# Patient Record
Sex: FEMALE | Race: WHITE | NOT HISPANIC OR LATINO | Employment: FULL TIME | URBAN - METROPOLITAN AREA
[De-identification: names, ages, dates, MRNs, and addresses within clinical notes are randomized per-mention and may not be internally consistent; named-entity substitution may affect disease eponyms.]

---

## 2017-08-21 ENCOUNTER — PATIENT OUTREACH (OUTPATIENT)
Dept: ADMINISTRATIVE | Facility: HOSPITAL | Age: 32
End: 2017-08-21

## 2017-08-21 NOTE — LETTER
August 21, 2017    Cheryl Cardozo  [address]  [place] [address]           [facility]  [address]  [facility] [address]  Phone: [phone] Dear Cheryl Cardozo    In the spirit of maintaining your good health, our system indicates that you have not been seen in the office in over 12 months and due for a visit.     Our system indicates that you are due for the following:   Health Maintenance Due   Topic Date Due    Lipid Panel  1983    TETANUS VACCINE  02/26/2001    Influenza Vaccine  08/01/2017     Omid Ornelas MD would like you to schedule an appointment for an annual exam at your earliest convenience. If you have completed any of these health maintenance requirements at an outside facility, please contact our office so we may update your health record.    If your PRIMARY CARE PHYSICIAN has changed please contact your insurance company to reflect the correct physician.    If you have any issues or need assistance in scheduling this appointment, please call the number below.     PERLA Turcios  Care Coordination Department  [facility]  [phone]

## 2017-12-04 ENCOUNTER — OFFICE VISIT (OUTPATIENT)
Dept: FAMILY MEDICINE | Facility: CLINIC | Age: 32
End: 2017-12-04
Payer: OTHER GOVERNMENT

## 2017-12-04 VITALS
BODY MASS INDEX: 43.69 KG/M2 | DIASTOLIC BLOOD PRESSURE: 68 MMHG | SYSTOLIC BLOOD PRESSURE: 113 MMHG | WEIGHT: 255.94 LBS | HEART RATE: 81 BPM | TEMPERATURE: 98 F | HEIGHT: 64 IN

## 2017-12-04 DIAGNOSIS — Z00.00 ROUTINE HISTORY AND PHYSICAL EXAMINATION OF ADULT: Primary | ICD-10-CM

## 2017-12-04 DIAGNOSIS — Z3A.13 13 WEEKS GESTATION OF PREGNANCY: ICD-10-CM

## 2017-12-04 DIAGNOSIS — E66.01 MORBID OBESITY: ICD-10-CM

## 2017-12-04 DIAGNOSIS — Z23 NEED FOR PROPHYLACTIC VACCINATION AND INOCULATION AGAINST INFLUENZA: ICD-10-CM

## 2017-12-04 PROCEDURE — 99395 PREV VISIT EST AGE 18-39: CPT | Mod: S$PBB,,, | Performed by: FAMILY MEDICINE

## 2017-12-04 PROCEDURE — 99213 OFFICE O/P EST LOW 20 MIN: CPT | Mod: PBBFAC,PO | Performed by: FAMILY MEDICINE

## 2017-12-04 PROCEDURE — 99999 PR PBB SHADOW E&M-EST. PATIENT-LVL III: CPT | Mod: PBBFAC,,, | Performed by: FAMILY MEDICINE

## 2017-12-04 PROCEDURE — 90471 IMMUNIZATION ADMIN: CPT | Mod: PBBFAC,PO

## 2017-12-04 RX ORDER — CEPHALEXIN 250 MG/1
CAPSULE ORAL
COMMUNITY
Start: 2017-12-01 | End: 2018-05-29

## 2017-12-05 NOTE — PROGRESS NOTES
Patient presents physical exam.  Also needs GYN referral for 13 week pregnancy.  Morbid obesity, states currently has lost a few pounds with current pregnancy.  Apparently gained a significant amount with previous pregnancy in the past 2 years.  she also had a miscarriage in the past year.    Past Medical History:  Past Medical History:   Diagnosis Date    ADD (attention deficit disorder)     Morbid obesity 2017    Obesity     Plantar fasciitis 2012     Past Surgical History:   Procedure Laterality Date     SECTION  2016    TONSILLECTOMY, ADENOIDECTOMY       Social History     Social History    Marital status:      Spouse name: N/A    Number of children: N/A    Years of education: N/A     Occupational History    Not on file.     Social History Main Topics    Smoking status: Former Smoker    Smokeless tobacco: Never Used    Alcohol use 1.2 oz/week     1 Glasses of wine, 1 Cans of beer per week      Comment: rarely    Drug use: Unknown    Sexual activity: Not on file     Other Topics Concern    Not on file     Social History Narrative    No narrative on file     Family History   Problem Relation Age of Onset    Cervical cancer Mother     Scleroderma Mother     Thyroid cancer Father     Diabetes Father     Bipolar disorder Father      Review of patient's allergies indicates:  No Known Allergies  Current Outpatient Prescriptions on File Prior to Visit   Medication Sig Dispense Refill    ascorbic acid (VITAMIN C) 500 MG tablet Take 500 mg by mouth once daily.        PRENATAL 25/IRON FUM/FOLIC/DHA (PRENATAL-1 ORAL) Take 1 tablet by mouth once daily at 6am.      fexofenadine (ALLEGRA ALLERGY) 180 MG tablet Take 180 mg by mouth once daily.        ibuprofen (ADVIL,MOTRIN) 200 MG tablet Take 200 mg by mouth every 6 (six) hours as needed.         No current facility-administered medications on file prior to visit.            ROS:  GENERAL: No fever, chills,  or  "significant weight changes.  HEENT: No headache or hearing complaints.  No dysphagia  Eyes: No vision complaints  CHEST: Denies KRAMER, cyanosis, wheezing, cough and sputum production.  CARDIOVASCULAR: Denies chest pain, PND, orthopnea or reduced exercise tolerance.  ABDOMEN: Appetite fine. Denies diarrhea, abdominal pain, hematemesis or blood in stool.  URINARY: No flank pain, dysuria or hematuria.  MUSCULOSKELETAL: No warmth swelling or tenderness of the joints  NEUROLOGIC: No focal weakness numbness or paresthesia  PSYCHIATRIC: Denies depression        OBJECTIVE:     Vitals:    12/04/17 1027   BP: 113/68   Pulse: 81   Temp: 98 °F (36.7 °C)   TempSrc: Oral   Weight: 116.1 kg (255 lb 15.3 oz)   Height: 5' 4" (1.626 m)     Wt Readings from Last 3 Encounters:   12/04/17 116.1 kg (255 lb 15.3 oz)   07/03/17 114.5 kg (252 lb 6.8 oz)   03/06/15 79.4 kg (175 lb)     APPEARANCE: Well nourished, well developed, in no acute distress.    HEAD: Normocephalic.  Atraumatic.  No sinus tenderness.  EYES:   Right eye: Pupil reactive.  Conjunctiva clear.    Left eye: Pupil reactive.  Conjunctiva clear.    Both fundi:  Grossly normal to nondilated exam. EOMI.    EARS: TM's intact. Light reflex normal. No retraction or perforation.    NOSE:  clear.  MOUTH & THROAT:  No pharyngeal erythema or exudate. No lesions.  NECK: Supple. No bruits.  No JVD.  No cervical lymphadenopathy.  No thyromegaly.    CHEST: Breath sounds clear bilaterally.  Normal respiratory effort  CARDIOVASCULAR: Normal rate.  Regular rhythm.  No murmurs.  No rub.  No gallops.  ABDOMEN: Bowel sounds normal.  Soft.  No tenderness.  No organomegaly.  PERIPHERAL VASCULAR: No cyanosis.  No clubbing.  No edema.  NEUROLOGIC: No focal findings.  MENTAL STATUS: Alert.  Oriented x 3.    Cheryl was seen today for annual exam.    Diagnoses and all orders for this visit:    Routine history and physical examination of adult    13 weeks gestation of pregnancy  -     Ambulatory referral " to Gynecology    Need for prophylactic vaccination and inoculation against influenza  -     Influenza - Quadrivalent (3 years & older) (PF)    Morbid obesity      Anticipatory guidance: Don't smoke.  Healthy diet and regular exercise recommended.  Recommend Tdap later in her pregnancy.  Recommend lipid screening after she completes her pregnancy

## 2017-12-13 ENCOUNTER — PATIENT MESSAGE (OUTPATIENT)
Dept: FAMILY MEDICINE | Facility: CLINIC | Age: 32
End: 2017-12-13

## 2017-12-14 ENCOUNTER — TELEPHONE (OUTPATIENT)
Dept: FAMILY MEDICINE | Facility: CLINIC | Age: 32
End: 2017-12-14

## 2017-12-14 NOTE — TELEPHONE ENCOUNTER
----- Message from Bri Ochoa sent at 12/14/2017  7:57 AM CST -----  Pt at [phone]//states she is returning your call from yesterday//please call again//oly/indu

## 2017-12-30 ENCOUNTER — NURSE TRIAGE (OUTPATIENT)
Dept: ADMINISTRATIVE | Facility: CLINIC | Age: 32
End: 2017-12-30

## 2017-12-30 NOTE — TELEPHONE ENCOUNTER
"  Reason for Disposition   Pain or burning with urination    Answer Assessment - Initial Assessment Questions  1. LOCATION: "Where does it hurt?" (e.g., left, right)      Right side   2. ONSET: "When did the pain start?"      Yesterday   3. SEVERITY: "How bad is the pain?" (e.g., Scale 1-10; mild, moderate, or severe)    - MILD (1-3): doesn't interfere with normal activities     - MODERATE (4-7): interferes with normal activities or awakens from sleep     - SEVERE (8-10): excruciating pain and patient unable to do normal activities (stays in bed)        Moderate   4. PATTERN: "Does the pain come and go, or is it constant?"       Constant   5. CAUSE: "What do you think is causing the pain?"      Recurrent UTI   6. OTHER SYMPTOMS:  "Do you have any other symptoms?" (e.g., fever, abdominal pain, vomiting, leg weakness, burning with urination, blood in urine)      Low grade fever 99.0 (O) last night   7. PREGNANCY:  "Is there any chance you are pregnant?" "When was your last menstrual period?"      17 weeks    Protocols used: ST FLANK PAIN-A-AH    "

## 2018-05-29 ENCOUNTER — OFFICE VISIT (OUTPATIENT)
Dept: ORTHOPEDICS | Facility: CLINIC | Age: 33
End: 2018-05-29
Payer: OTHER GOVERNMENT

## 2018-05-29 VITALS
HEIGHT: 64 IN | WEIGHT: 255.94 LBS | BODY MASS INDEX: 43.69 KG/M2 | HEART RATE: 79 BPM | DIASTOLIC BLOOD PRESSURE: 82 MMHG | SYSTOLIC BLOOD PRESSURE: 128 MMHG

## 2018-05-29 DIAGNOSIS — S93.431A ANKLE SYNDESMOSIS DISRUPTION, RIGHT, INITIAL ENCOUNTER: ICD-10-CM

## 2018-05-29 DIAGNOSIS — S82.831A OTHER CLOSED FRACTURE OF DISTAL END OF RIGHT FIBULA, INITIAL ENCOUNTER: Primary | ICD-10-CM

## 2018-05-29 DIAGNOSIS — E55.9 VITAMIN D DEFICIENCY: ICD-10-CM

## 2018-05-29 DIAGNOSIS — T14.8XXA FRACTURE: ICD-10-CM

## 2018-05-29 PROCEDURE — 99244 OFF/OP CNSLTJ NEW/EST MOD 40: CPT | Mod: S$PBB,57,, | Performed by: ORTHOPAEDIC SURGERY

## 2018-05-29 PROCEDURE — 99213 OFFICE O/P EST LOW 20 MIN: CPT | Mod: PBBFAC,PN | Performed by: ORTHOPAEDIC SURGERY

## 2018-05-29 PROCEDURE — 99999 PR PBB SHADOW E&M-EST. PATIENT-LVL III: CPT | Mod: PBBFAC,,, | Performed by: ORTHOPAEDIC SURGERY

## 2018-05-29 RX ORDER — SODIUM CHLORIDE 9 MG/ML
INJECTION, SOLUTION INTRAVENOUS CONTINUOUS
Status: CANCELLED | OUTPATIENT
Start: 2018-05-29

## 2018-05-29 RX ORDER — MUPIROCIN 20 MG/G
OINTMENT TOPICAL
Status: CANCELLED | OUTPATIENT
Start: 2018-05-29

## 2018-05-29 NOTE — PROGRESS NOTES
"HPI: Cheryl Cardozo is a 33 y.o. female who was referred to me by Dr. Mara Wells and was seen in consultation today for right ankle pain. She injured her ankle when she was leaving the hotel in [place] after she had a baby who is in the NICU at [facility]. She was placed in a splint and is here for f/u today. She rates her pain as 4/10 today.     PAST MEDICAL/SURGICAL/FAMILY/SOCIAL/ HISTORY: REVIEWED    ALLERGIES/MEDICATIONS: REVIEWED       Review of Systems:     Constitution: Negative.   HEENT: Negative.   Eyes: Negative.   Cardiovascular: Negative.   Respiratory: Negative.   Endocrine: Negative.   Hematologic/Lymphatic: Negative.   Skin: Negative.   Musculoskeletal: Positive for right ankle pain   Gastrointestinal: Negative.   Genitourinary: Negative.   Neurological: Negative.   Psychiatric/Behavioral: Negative.   Allergic/Immunologic: Negative.       PHYSICAL EXAM:  Vitals:    05/29/18 0935   BP: 128/82   Pulse: 79     Ht Readings from Last 1 Encounters:   05/29/18 5' 4" (1.626 m)     Wt Readings from Last 1 Encounters:   05/29/18 116.1 kg (255 lb 15.3 oz)         GENERAL: Well developed, well nourished, no acute distress.  SKIN: Skin is intact. No atrophy, abrasions or lesions are noted.   Neurological: Normal mental status. Appropriate and conversant. Alert and oriented x 3.  GAIT: in wheelchair    Right lower extremity compared with LLE:  2+ dorsalis pedis pulse.  Capillary refill < 3 seconds.  Decreased range of motion tibiotalar and subtalar joints due to pain and swelling. Normal alignment of the forefoot and the hindfoot.  Moves toes well. No fracture blisters.  Sensation to light touch intact sural, saphenous, superficial peroneal and deep peroneal nerves. + swelling and ecchymosis. No lymphadenopathy, no masses or tumors palpated.    Left anterolateral ankle has some swelling but has normal motion and is non-tender to palpation.      XRAYS:   3 views of right ankle reviewed today reveal mildly " displaced distal fibula fracture with significant widening of the medial clear space.       ASSESSMENT:        Encounter Diagnoses   Name Primary?    Fracture Yes    Other closed fracture of distal end of right fibula, initial encounter     Ankle syndesmosis disruption, right, initial encounter         PLAN:   I spent 20 minutes in consulation with the patient today. More than half the time was spent counseling the patient on her condition and the options for operative.  I recommend right ankle arthroscopy, open reduction internal fixation fibula and syndesmosis. I have explained the procedure, including indications, risks, and alternatives.  Cheryl Cardozo gave informed consent and is medically ready for surgery.

## 2018-05-30 ENCOUNTER — HOSPITAL ENCOUNTER (OUTPATIENT)
Dept: RADIOLOGY | Facility: HOSPITAL | Age: 33
Discharge: HOME | End: 2018-05-30
Attending: ORTHOPAEDIC SURGERY | Admitting: ORTHOPAEDIC SURGERY
Payer: OTHER GOVERNMENT

## 2018-05-30 ENCOUNTER — ANESTHESIA (OUTPATIENT)
Dept: SURGERY | Facility: HOSPITAL | Age: 33
End: 2018-05-30
Payer: OTHER GOVERNMENT

## 2018-05-30 ENCOUNTER — HOSPITAL ENCOUNTER (OUTPATIENT)
Facility: HOSPITAL | Age: 33
Discharge: HOME | End: 2018-05-30
Attending: ORTHOPAEDIC SURGERY | Admitting: ORTHOPAEDIC SURGERY
Payer: OTHER GOVERNMENT

## 2018-05-30 ENCOUNTER — SURGERY (OUTPATIENT)
Age: 33
End: 2018-05-30

## 2018-05-30 ENCOUNTER — ANESTHESIA EVENT (OUTPATIENT)
Dept: SURGERY | Facility: HOSPITAL | Age: 33
End: 2018-05-30
Payer: OTHER GOVERNMENT

## 2018-05-30 VITALS
BODY MASS INDEX: 39.27 KG/M2 | TEMPERATURE: 98 F | HEIGHT: 64 IN | SYSTOLIC BLOOD PRESSURE: 114 MMHG | OXYGEN SATURATION: 98 % | RESPIRATION RATE: 18 BRPM | DIASTOLIC BLOOD PRESSURE: 68 MMHG | WEIGHT: 230 LBS | HEART RATE: 78 BPM

## 2018-05-30 DIAGNOSIS — S93.431A ANKLE SYNDESMOSIS DISRUPTION, RIGHT, INITIAL ENCOUNTER: ICD-10-CM

## 2018-05-30 DIAGNOSIS — T14.8XXA FRACTURE: ICD-10-CM

## 2018-05-30 DIAGNOSIS — S82.891A CLOSED RIGHT ANKLE FRACTURE: ICD-10-CM

## 2018-05-30 DIAGNOSIS — S82.831A OTHER CLOSED FRACTURE OF DISTAL END OF RIGHT FIBULA, INITIAL ENCOUNTER: Primary | ICD-10-CM

## 2018-05-30 PROCEDURE — 01480 ANES OPEN PX LOWER L/A/F NOS: CPT | Mod: PO | Performed by: ORTHOPAEDIC SURGERY

## 2018-05-30 PROCEDURE — D9220A PRA ANESTHESIA: Mod: ANES,,, | Performed by: ANESTHESIOLOGY

## 2018-05-30 PROCEDURE — 29894 ANKLE ARTHROSCOPY/SURGERY: CPT | Mod: 51,RT,, | Performed by: ORTHOPAEDIC SURGERY

## 2018-05-30 PROCEDURE — 25000003 PHARM REV CODE 250: Mod: PO | Performed by: ANESTHESIOLOGY

## 2018-05-30 PROCEDURE — 37000008 HC ANESTHESIA 1ST 15 MINUTES: Mod: PO | Performed by: ORTHOPAEDIC SURGERY

## 2018-05-30 PROCEDURE — 63600175 PHARM REV CODE 636 W HCPCS: Mod: PO | Performed by: ANESTHESIOLOGY

## 2018-05-30 PROCEDURE — 76942 ECHO GUIDE FOR BIOPSY: CPT | Mod: 26,,, | Performed by: ANESTHESIOLOGY

## 2018-05-30 PROCEDURE — 63600175 PHARM REV CODE 636 W HCPCS: Mod: PO | Performed by: NURSE ANESTHETIST, CERTIFIED REGISTERED

## 2018-05-30 PROCEDURE — 71000033 HC RECOVERY, INTIAL HOUR: Mod: PO | Performed by: ORTHOPAEDIC SURGERY

## 2018-05-30 PROCEDURE — 63600175 PHARM REV CODE 636 W HCPCS: Mod: PO | Performed by: ORTHOPAEDIC SURGERY

## 2018-05-30 PROCEDURE — 64446 NJX AA&/STRD SC NRV NFS IMG: CPT | Mod: PO,59 | Performed by: ANESTHESIOLOGY

## 2018-05-30 PROCEDURE — 27201423 OPTIME MED/SURG SUP & DEVICES STERILE SUPPLY: Mod: PO | Performed by: ORTHOPAEDIC SURGERY

## 2018-05-30 PROCEDURE — 27829 TREAT LOWER LEG JOINT: CPT | Mod: RT,,, | Performed by: ORTHOPAEDIC SURGERY

## 2018-05-30 PROCEDURE — 27792 TREATMENT OF ANKLE FRACTURE: CPT | Mod: 51,RT,, | Performed by: ORTHOPAEDIC SURGERY

## 2018-05-30 PROCEDURE — 27200664 HC NERVE BLOCK COMPLETE KIT: Mod: PO | Performed by: ANESTHESIOLOGY

## 2018-05-30 PROCEDURE — 37000009 HC ANESTHESIA EA ADD 15 MINS: Mod: PO | Performed by: ORTHOPAEDIC SURGERY

## 2018-05-30 PROCEDURE — 36000711: Mod: PO | Performed by: ORTHOPAEDIC SURGERY

## 2018-05-30 PROCEDURE — 76000 FLUOROSCOPY <1 HR PHYS/QHP: CPT | Mod: TC,PO,59

## 2018-05-30 PROCEDURE — 25000003 PHARM REV CODE 250: Mod: PO | Performed by: NURSE ANESTHETIST, CERTIFIED REGISTERED

## 2018-05-30 PROCEDURE — 64450 NJX AA&/STRD OTHER PN/BRANCH: CPT | Mod: 59,RT,, | Performed by: ANESTHESIOLOGY

## 2018-05-30 PROCEDURE — 25000003 PHARM REV CODE 250: Mod: PO | Performed by: ORTHOPAEDIC SURGERY

## 2018-05-30 PROCEDURE — 71000039 HC RECOVERY, EACH ADD'L HOUR: Mod: PO | Performed by: ORTHOPAEDIC SURGERY

## 2018-05-30 PROCEDURE — C1713 ANCHOR/SCREW BN/BN,TIS/BN: HCPCS | Mod: PO | Performed by: ORTHOPAEDIC SURGERY

## 2018-05-30 PROCEDURE — 36000710: Mod: PO | Performed by: ORTHOPAEDIC SURGERY

## 2018-05-30 PROCEDURE — D9220A PRA ANESTHESIA: Mod: CRNA,,, | Performed by: NURSE ANESTHETIST, CERTIFIED REGISTERED

## 2018-05-30 DEVICE — SCREW BONE NON LOCK 3.5X20MM: Type: IMPLANTABLE DEVICE | Site: ANKLE | Status: FUNCTIONAL

## 2018-05-30 DEVICE — PLATE BONE FIB VARIAX LAT DIST: Type: IMPLANTABLE DEVICE | Site: ANKLE | Status: FUNCTIONAL

## 2018-05-30 DEVICE — SCREW BONE NON LOCK 3.5X12MM: Type: IMPLANTABLE DEVICE | Site: ANKLE | Status: FUNCTIONAL

## 2018-05-30 DEVICE — SCREW BONE NON LOCK 3.5X16MM: Type: IMPLANTABLE DEVICE | Site: ANKLE | Status: FUNCTIONAL

## 2018-05-30 DEVICE — SCREW BONE LOCK T10 3.5X16MM: Type: IMPLANTABLE DEVICE | Site: ANKLE | Status: FUNCTIONAL

## 2018-05-30 DEVICE — SCREW BONE LOCK T10 3.5X14MM: Type: IMPLANTABLE DEVICE | Site: ANKLE | Status: FUNCTIONAL

## 2018-05-30 RX ORDER — OXYCODONE HYDROCHLORIDE 5 MG/1
5 TABLET ORAL ONCE AS NEEDED
Status: DISCONTINUED | OUTPATIENT
Start: 2018-05-31 | End: 2018-05-30 | Stop reason: HOSPADM

## 2018-05-30 RX ORDER — MIDAZOLAM HYDROCHLORIDE 1 MG/ML
INJECTION INTRAMUSCULAR; INTRAVENOUS
Status: DISCONTINUED | OUTPATIENT
Start: 2018-05-30 | End: 2018-05-30

## 2018-05-30 RX ORDER — MEPERIDINE HYDROCHLORIDE 50 MG/ML
12.5 INJECTION INTRAMUSCULAR; INTRAVENOUS; SUBCUTANEOUS ONCE AS NEEDED
Status: DISCONTINUED | OUTPATIENT
Start: 2018-05-30 | End: 2018-05-30 | Stop reason: HOSPADM

## 2018-05-30 RX ORDER — HYDROMORPHONE HYDROCHLORIDE 2 MG/ML
0.2 INJECTION, SOLUTION INTRAMUSCULAR; INTRAVENOUS; SUBCUTANEOUS EVERY 5 MIN PRN
Status: DISCONTINUED | OUTPATIENT
Start: 2018-05-30 | End: 2018-05-30 | Stop reason: HOSPADM

## 2018-05-30 RX ORDER — OXYCODONE AND ACETAMINOPHEN 10; 325 MG/1; MG/1
1 TABLET ORAL EVERY 4 HOURS PRN
Qty: 42 TABLET | Refills: 0 | Status: SHIPPED | OUTPATIENT
Start: 2018-05-30 | End: 2018-12-04

## 2018-05-30 RX ORDER — FENTANYL CITRATE 50 UG/ML
50 INJECTION, SOLUTION INTRAMUSCULAR; INTRAVENOUS ONCE
Status: COMPLETED | OUTPATIENT
Start: 2018-05-30 | End: 2018-05-30

## 2018-05-30 RX ORDER — OXYCODONE HYDROCHLORIDE 5 MG/1
5 TABLET ORAL ONCE
Status: COMPLETED | OUTPATIENT
Start: 2018-05-30 | End: 2018-05-30

## 2018-05-30 RX ORDER — CEFAZOLIN SODIUM 1 G/3ML
2 INJECTION, POWDER, FOR SOLUTION INTRAMUSCULAR; INTRAVENOUS
Status: COMPLETED | OUTPATIENT
Start: 2018-05-30 | End: 2018-05-30

## 2018-05-30 RX ORDER — SUCCINYLCHOLINE CHLORIDE 20 MG/ML
INJECTION INTRAMUSCULAR; INTRAVENOUS
Status: DISCONTINUED | OUTPATIENT
Start: 2018-05-30 | End: 2018-05-30

## 2018-05-30 RX ORDER — SODIUM CHLORIDE 0.9 % (FLUSH) 0.9 %
3 SYRINGE (ML) INJECTION
Status: DISCONTINUED | OUTPATIENT
Start: 2018-05-30 | End: 2018-05-30 | Stop reason: HOSPADM

## 2018-05-30 RX ORDER — MUPIROCIN 20 MG/G
OINTMENT TOPICAL
Status: DISCONTINUED | OUTPATIENT
Start: 2018-05-30 | End: 2018-05-30 | Stop reason: HOSPADM

## 2018-05-30 RX ORDER — FENTANYL CITRATE 50 UG/ML
25 INJECTION, SOLUTION INTRAMUSCULAR; INTRAVENOUS EVERY 5 MIN PRN
Status: COMPLETED | OUTPATIENT
Start: 2018-05-30 | End: 2018-05-30

## 2018-05-30 RX ORDER — DEXAMETHASONE SODIUM PHOSPHATE 4 MG/ML
8 INJECTION, SOLUTION INTRA-ARTICULAR; INTRALESIONAL; INTRAMUSCULAR; INTRAVENOUS; SOFT TISSUE
Status: COMPLETED | OUTPATIENT
Start: 2018-05-30 | End: 2018-05-30

## 2018-05-30 RX ORDER — FENTANYL CITRATE 50 UG/ML
INJECTION, SOLUTION INTRAMUSCULAR; INTRAVENOUS
Status: DISCONTINUED | OUTPATIENT
Start: 2018-05-30 | End: 2018-05-30

## 2018-05-30 RX ORDER — ONDANSETRON 2 MG/ML
INJECTION INTRAMUSCULAR; INTRAVENOUS
Status: DISCONTINUED | OUTPATIENT
Start: 2018-05-30 | End: 2018-05-30

## 2018-05-30 RX ORDER — LIDOCAINE HCL/PF 100 MG/5ML
SYRINGE (ML) INTRAVENOUS
Status: DISCONTINUED | OUTPATIENT
Start: 2018-05-30 | End: 2018-05-30

## 2018-05-30 RX ORDER — SODIUM CHLORIDE 0.9 G/100ML
IRRIGANT IRRIGATION
Status: DISCONTINUED | OUTPATIENT
Start: 2018-05-30 | End: 2018-05-30 | Stop reason: HOSPADM

## 2018-05-30 RX ORDER — DIPHENHYDRAMINE HYDROCHLORIDE 50 MG/ML
25 INJECTION INTRAMUSCULAR; INTRAVENOUS EVERY 6 HOURS PRN
Status: DISCONTINUED | OUTPATIENT
Start: 2018-05-30 | End: 2018-05-30 | Stop reason: HOSPADM

## 2018-05-30 RX ORDER — PROPOFOL 10 MG/ML
VIAL (ML) INTRAVENOUS
Status: DISCONTINUED | OUTPATIENT
Start: 2018-05-30 | End: 2018-05-30

## 2018-05-30 RX ORDER — MIDAZOLAM HYDROCHLORIDE 1 MG/ML
2 INJECTION INTRAMUSCULAR; INTRAVENOUS ONCE
Status: COMPLETED | OUTPATIENT
Start: 2018-05-30 | End: 2018-05-30

## 2018-05-30 RX ORDER — KETAMINE HYDROCHLORIDE 100 MG/ML
INJECTION, SOLUTION INTRAMUSCULAR; INTRAVENOUS
Status: DISCONTINUED | OUTPATIENT
Start: 2018-05-30 | End: 2018-05-30

## 2018-05-30 RX ORDER — SODIUM CHLORIDE 9 MG/ML
INJECTION, SOLUTION INTRAVENOUS CONTINUOUS
Status: DISCONTINUED | OUTPATIENT
Start: 2018-05-30 | End: 2018-05-30 | Stop reason: HOSPADM

## 2018-05-30 RX ORDER — LIDOCAINE HYDROCHLORIDE 10 MG/ML
1 INJECTION, SOLUTION EPIDURAL; INFILTRATION; INTRACAUDAL; PERINEURAL ONCE
Status: DISCONTINUED | OUTPATIENT
Start: 2018-05-30 | End: 2018-05-30 | Stop reason: HOSPADM

## 2018-05-30 RX ORDER — SODIUM CHLORIDE, SODIUM LACTATE, POTASSIUM CHLORIDE, CALCIUM CHLORIDE 600; 310; 30; 20 MG/100ML; MG/100ML; MG/100ML; MG/100ML
INJECTION, SOLUTION INTRAVENOUS CONTINUOUS
Status: DISCONTINUED | OUTPATIENT
Start: 2018-05-30 | End: 2018-05-30 | Stop reason: HOSPADM

## 2018-05-30 RX ORDER — OXYCODONE HYDROCHLORIDE 5 MG/1
5 TABLET ORAL
Status: DISCONTINUED | OUTPATIENT
Start: 2018-05-30 | End: 2018-05-30 | Stop reason: HOSPADM

## 2018-05-30 RX ORDER — ONDANSETRON 8 MG/1
8 TABLET, ORALLY DISINTEGRATING ORAL EVERY 8 HOURS PRN
Qty: 10 TABLET | Refills: 0 | Status: SHIPPED | OUTPATIENT
Start: 2018-05-30 | End: 2019-09-13

## 2018-05-30 RX ORDER — ROCURONIUM BROMIDE 10 MG/ML
INJECTION, SOLUTION INTRAVENOUS
Status: DISCONTINUED | OUTPATIENT
Start: 2018-05-30 | End: 2018-05-30

## 2018-05-30 RX ORDER — FENTANYL CITRATE 50 UG/ML
25 INJECTION, SOLUTION INTRAMUSCULAR; INTRAVENOUS ONCE
Status: COMPLETED | OUTPATIENT
Start: 2018-05-30 | End: 2018-05-30

## 2018-05-30 RX ADMIN — PROPOFOL 50 MG: 10 INJECTION, EMULSION INTRAVENOUS at 03:05

## 2018-05-30 RX ADMIN — KETAMINE HYDROCHLORIDE 25 MG: 100 INJECTION, SOLUTION, CONCENTRATE INTRAMUSCULAR; INTRAVENOUS at 02:05

## 2018-05-30 RX ADMIN — FENTANYL CITRATE 25 MCG: 50 INJECTION, SOLUTION INTRAMUSCULAR; INTRAVENOUS at 04:05

## 2018-05-30 RX ADMIN — SUCCINYLCHOLINE CHLORIDE 140 MG: 20 INJECTION, SOLUTION INTRAMUSCULAR; INTRAVENOUS at 02:05

## 2018-05-30 RX ADMIN — SODIUM CHLORIDE, SODIUM LACTATE, POTASSIUM CHLORIDE, AND CALCIUM CHLORIDE: .6; .31; .03; .02 INJECTION, SOLUTION INTRAVENOUS at 10:05

## 2018-05-30 RX ADMIN — LIDOCAINE HYDROCHLORIDE 100 MG: 20 INJECTION PARENTERAL at 02:05

## 2018-05-30 RX ADMIN — ONDANSETRON 4 MG: 2 INJECTION, SOLUTION INTRAMUSCULAR; INTRAVENOUS at 03:05

## 2018-05-30 RX ADMIN — OXYCODONE HYDROCHLORIDE 5 MG: 5 TABLET ORAL at 04:05

## 2018-05-30 RX ADMIN — DEXAMETHASONE SODIUM PHOSPHATE 8 MG: 4 INJECTION, SOLUTION INTRAMUSCULAR; INTRAVENOUS at 10:05

## 2018-05-30 RX ADMIN — FENTANYL CITRATE 25 MCG: 50 INJECTION, SOLUTION INTRAMUSCULAR; INTRAVENOUS at 11:05

## 2018-05-30 RX ADMIN — MIDAZOLAM HYDROCHLORIDE 2 MG: 1 INJECTION, SOLUTION INTRAMUSCULAR; INTRAVENOUS at 11:05

## 2018-05-30 RX ADMIN — FENTANYL CITRATE 50 MCG: 50 INJECTION, SOLUTION INTRAMUSCULAR; INTRAVENOUS at 02:05

## 2018-05-30 RX ADMIN — CEFAZOLIN 2 G: 1 INJECTION, POWDER, FOR SOLUTION INTRAVENOUS at 02:05

## 2018-05-30 RX ADMIN — SODIUM CHLORIDE 3000 ML: 0.9 IRRIGANT IRRIGATION at 02:05

## 2018-05-30 RX ADMIN — ROCURONIUM BROMIDE 5 MG: 10 INJECTION, SOLUTION INTRAVENOUS at 02:05

## 2018-05-30 RX ADMIN — PROPOFOL 150 MG: 10 INJECTION, EMULSION INTRAVENOUS at 02:05

## 2018-05-30 RX ADMIN — MUPIROCIN 1 TUBE: 20 OINTMENT TOPICAL at 02:05

## 2018-05-30 RX ADMIN — SODIUM CHLORIDE, SODIUM LACTATE, POTASSIUM CHLORIDE, AND CALCIUM CHLORIDE: .6; .31; .03; .02 INJECTION, SOLUTION INTRAVENOUS at 02:05

## 2018-05-30 RX ADMIN — MIDAZOLAM HYDROCHLORIDE 2 MG: 1 INJECTION, SOLUTION INTRAMUSCULAR; INTRAVENOUS at 02:05

## 2018-05-30 RX ADMIN — FENTANYL CITRATE 50 MCG: 50 INJECTION, SOLUTION INTRAMUSCULAR; INTRAVENOUS at 03:05

## 2018-05-30 NOTE — DISCHARGE INSTRUCTIONS
Discharge Instructions: After Your Surgery  Youve just had surgery. During surgery, you were given medicine called anesthesia to keep you relaxed and free of pain. After surgery, you may have some pain or nausea. This is common. Here are some tips for feeling better and getting well after surgery.     Stay on schedule with your medicine.   Going home  Your healthcare provider will show you how to take care of yourself when you go home. He or she will also answer your questions. Have an adult family member or friend drive you home. For the first 24 hours after your surgery:  · Do not drive or use heavy equipment.  · Do not make important decisions or sign legal papers.  · Do not drink alcohol.  · Have someone stay with you, if needed. He or she can watch for problems and help keep you safe.  Be sure to go to all follow-up visits with your healthcare provider. And rest after your surgery for as long as your healthcare provider tells you to.  Coping with pain  If you have pain after surgery, pain medicine will help you feel better. Take it as told, before pain becomes severe. Also, ask your healthcare provider or pharmacist about other ways to control pain. This might be with heat, ice, or relaxation. And follow any other instructions your surgeon or nurse gives you.  Tips for taking pain medicine  To get the best relief possible, remember these points:  · Pain medicines can upset your stomach. Taking them with a little food may help.  · Most pain relievers taken by mouth need at least 20 to 30 minutes to start to work.  · Taking medicine on a schedule can help you remember to take it. Try to time your medicine so that you can take it before starting an activity. This might be before you get dressed, go for a walk, or sit down for dinner.  · Constipation is a common side effect of pain medicines. Call your healthcare provider before taking any medicines such as laxatives or stool softeners to help ease constipation.  Also ask if you should skip any foods. Drinking lots of fluids and eating foods such as fruits and vegetables that are high in fiber can also help. Remember, do not take laxatives unless your surgeon has prescribed them.  · Drinking alcohol and taking pain medicine can cause dizziness and slow your breathing. It can even be deadly. Do not drink alcohol while taking pain medicine.  · Pain medicine can make you react more slowly to things. Do not drive or run machinery while taking pain medicine.  Your healthcare provider may tell you to take acetaminophen to help ease your pain. Ask him or her how much you are supposed to take each day. Acetaminophen or other pain relievers may interact with your prescription medicines or other over-the-counter (OTC) medicines. Some prescription medicines have acetaminophen and other ingredients. Using both prescription and OTC acetaminophen for pain can cause you to overdose. Read the labels on your OTC medicines with care. This will help you to clearly know the list of ingredients, how much to take, and any warnings. It may also help you not take too much acetaminophen. If you have questions or do not understand the information, ask your pharmacist or healthcare provider to explain it to you before you take the OTC medicine.  Managing nausea  Some people have an upset stomach after surgery. This is often because of anesthesia, pain, or pain medicine, or the stress of surgery. These tips will help you handle nausea and eat healthy foods as you get better. If you were on a special food plan before surgery, ask your healthcare provider if you should follow it while you get better. These tips may help:  · Do not push yourself to eat. Your body will tell you when to eat and how much.  · Start off with clear liquids and soup. They are easier to digest.  · Next try semi-solid foods, such as mashed potatoes, applesauce, and gelatin, as you feel ready.  · Slowly move to solid foods. Dont  eat fatty, rich, or spicy foods at first.  · Do not force yourself to have 3 large meals a day. Instead eat smaller amounts more often.  · Take pain medicines with a small amount of solid food, such as crackers or toast, to avoid nausea.     Call your surgeon if  · You still have pain an hour after taking medicine. The medicine may not be strong enough.  · You feel too sleepy, dizzy, or groggy. The medicine may be too strong.  · You have side effects like nausea, vomiting, or skin changes, such as rash, itching, or hives.       If you have obstructive sleep apnea  You were given anesthesia medicine during surgery to keep you comfortable and free of pain. After surgery, you may have more apnea spells because of this medicine and other medicines you were given. The spells may last longer than usual.   At home:  · Keep using the continuous positive airway pressure (CPAP) device when you sleep. Unless your healthcare provider tells you not to, use it when you sleep, day or night. CPAP is a common device used to treat obstructive sleep apnea.  · Talk with your provider before taking any pain medicine, muscle relaxants, or sedatives. Your provider will tell you about the possible dangers of taking these medicines.  Date Last Reviewed: 12/1/2016 © 2000-2017 [employer]. [address] [place] [address]. All rights reserved. This information is not intended as a substitute for professional medical care. Always follow your healthcare professional's instructions.      ANKLE SURGERY  After surgery:    DOS:   Keep leg elevated until first post operative visit   Keep dressing clean and dry DO NOT CHANGE BANDAGES   Advance diet as tolerated.    Check circulation frequently in toes by pressing down on toenail. Nail should turn white and then pink WITHIN 3 SECONDS when released.   May not walk on foot to go to the bathroom  DO NOT WALK ON FOOT. Use wheelchair and crutches for walking   Resume home  medications    DONT:   Do not remove your dressing   Do not get dressing wet.   No driving until released by MD   DO NOT TAKE ADDITIONAL TYLENOL/ACETAMINOPHEN WHILE TAKING NARCOTIC PAIN MEDICATION THAT CONTAINS TYLENOL/ACETAMINOPHEN.    CALL PHYSICIAN FOR:   Burning, or numbness of the toes not relieved by elevation of the leg.   Pale or cold toes; bluish nail beds.   Redness, swelling, or bleeding.   Fever> 101   Drainage (pus) from the operative sites   Pain unrelieved by pain medication    FOR EMERGENCIES CONTACT YOUR PHYSICIAN'S OFFICE

## 2018-05-30 NOTE — PLAN OF CARE
Patient reported a csection x 9 days ago. Patient stated no possibility of being pregnant. Dr. Womack, anesthesia informed of situation and waived pregnancy test. Instructed patient of the UPT, verbalized understanding. Patient is still bleeding and breasts leaking. Offered blue pad and sanitary pad in preop for comfort.

## 2018-05-30 NOTE — ANESTHESIA POSTPROCEDURE EVALUATION
"Anesthesia Post Evaluation    Patient: Cheryl Cardozo    Procedure(s) Performed: Procedure(s) (LRB):  ARTHROSCOPY, ANKLE (Right)  ORIF, FRACTURE, FIBULA (Right)  FIXATION, SYNDESMOSIS, ANKLE (Right)    Final Anesthesia Type: general  Patient location during evaluation: PACU  Patient participation: Yes- Able to Participate  Level of consciousness: awake and alert and oriented  Post-procedure vital signs: reviewed and stable  Pain management: adequate  Airway patency: patent  PONV status at discharge: No PONV  Anesthetic complications: no      Cardiovascular status: blood pressure returned to baseline and stable  Respiratory status: unassisted and spontaneous ventilation  Hydration status: euvolemic  Follow-up not needed.        Visit Vitals  /69 (BP Location: Left arm, Patient Position: Lying)   Pulse 90   Temp 36.7 °C (98.1 °F) (Skin)   Resp 16   Ht 5' 4" (1.626 m)   Wt 104.3 kg (230 lb)   SpO2 96%   Breastfeeding? Yes   BMI 39.48 kg/m²       Pain/Ivan Score: Pain Assessment Performed: Yes (5/30/2018  3:45 PM)  Presence of Pain: denies (5/30/2018  3:45 PM)  Pain Rating Prior to Med Admin: 7 (5/30/2018  4:06 PM)  Ivan Score: 9 (5/30/2018  3:45 PM)     Pt comfortable in PACU and pain well controlled with block and PO meds.    At discharge, Pt notes some "burning" in anterior medial aspect of ankle (well within Femoral distribution) which is relieved by PO meds.  Advised her this area is uncovered by block but PO meds will help.      "

## 2018-05-30 NOTE — TRANSFER OF CARE
"Anesthesia Transfer of Care Note    Patient: Cheryl Cardozo    Procedure(s) Performed: Procedure(s) (LRB):  ARTHROSCOPY, ANKLE (Right)  ORIF, FRACTURE, FIBULA (Right)  FIXATION, SYNDESMOSIS, ANKLE (Right)    Patient location: PACU    Anesthesia Type: general    Transport from OR: Transported from OR on room air with adequate spontaneous ventilation    Post pain: adequate analgesia    Post assessment: no apparent anesthetic complications and tolerated procedure well    Post vital signs: stable    Level of consciousness: awake and sedated    Nausea/Vomiting: no nausea/vomiting    Complications: none    Transfer of care protocol was followed      Last vitals:   Visit Vitals  /69 (BP Location: Left arm, Patient Position: Lying)   Pulse 90   Temp 36.7 °C (98.1 °F) (Skin)   Resp 16   Ht 5' 4" (1.626 m)   Wt 104.3 kg (230 lb)   SpO2 96%   Breastfeeding? Yes   BMI 39.48 kg/m²     "

## 2018-05-30 NOTE — H&P
"HPI: Cheryl Cardozo is a 33 y.o. female who was referred to me by Dr. Mara Wells and was seen in consultation today for right ankle pain. She injured her ankle when she was leaving the hotel in [place] after she had a baby who is in the NICU at [facility]. She was placed in a splint and is here for f/u today. She rates her pain as 4/10 today.      PAST MEDICAL/SURGICAL/FAMILY/SOCIAL/ HISTORY: REVIEWED    ALLERGIES/MEDICATIONS: REVIEWED         Review of Systems:     Constitution: Negative.   HEENT: Negative.   Eyes: Negative.   Cardiovascular: Negative.   Respiratory: Negative.   Endocrine: Negative.   Hematologic/Lymphatic: Negative.   Skin: Negative.   Musculoskeletal: Positive for right ankle pain   Gastrointestinal: Negative.   Genitourinary: Negative.   Neurological: Negative.   Psychiatric/Behavioral: Negative.   Allergic/Immunologic: Negative.         PHYSICAL EXAM:      Vitals:     05/29/18 0935   BP: 128/82   Pulse: 79          Ht Readings from Last 1 Encounters:   05/29/18 5' 4" (1.626 m)          Wt Readings from Last 1 Encounters:   05/29/18 116.1 kg (255 lb 15.3 oz)            GENERAL: Well developed, well nourished, no acute distress.  SKIN: Skin is intact. No atrophy, abrasions or lesions are noted.   Neurological: Normal mental status. Appropriate and conversant. Alert and oriented x 3.  GAIT: in wheelchair     Right lower extremity compared with LLE:  2+ dorsalis pedis pulse.  Capillary refill < 3 seconds.  Decreased range of motion tibiotalar and subtalar joints due to pain and swelling. Normal alignment of the forefoot and the hindfoot.  Moves toes well. No fracture blisters.  Sensation to light touch intact sural, saphenous, superficial peroneal and deep peroneal nerves. + swelling and ecchymosis. No lymphadenopathy, no masses or tumors palpated.    Left anterolateral ankle has some swelling but has normal motion and is non-tender to palpation.        XRAYS:   3 views of right ankle " reviewed today reveal mildly displaced distal fibula fracture with significant widening of the medial clear space.         ASSESSMENT:           Encounter Diagnoses   Name Primary?    Fracture Yes    Other closed fracture of distal end of right fibula, initial encounter      Ankle syndesmosis disruption, right, initial encounter          PLAN:   I spent 20 minutes in consulation with the patient today. More than half the time was spent counseling the patient on her condition and the options for operative.  I recommend right ankle arthroscopy, open reduction internal fixation fibula and syndesmosis. I have explained the procedure, including indications, risks, and alternatives.  Cheryl Cardozo gave informed consent and is medically ready for surgery.

## 2018-05-30 NOTE — ANESTHESIA PREPROCEDURE EVALUATION
05/30/2018  Cheryl Cardozo is a 33 y.o., female.    Anesthesia Evaluation    I have reviewed the Patient Summary Reports.    I have reviewed the Nursing Notes.   I have reviewed the Medications.     Review of Systems  Anesthesia Hx:  No problems with previous Anesthesia    Social:  Former Smoker    Cardiovascular:  Cardiovascular Normal     Pulmonary:  Pulmonary Normal    Renal/:  Renal/ Normal     Neurological:  Neurology Normal    Endocrine:  Endocrine Normal    Psych:   Psychiatric History (ADD)          Physical Exam  General:  Well nourished, Morbid Obesity    Airway/Jaw/Neck:  Airway Findings: Mouth Opening: Normal Tongue: Normal  General Airway Assessment: Adult  Oropharynx Findings:  Mallampati: II  TM Distance: Normal, at least 6 cm  Jaw/Neck Findings:  Neck ROM: Normal ROM     Eyes/Ears/Nose:  Eyes/Ears/Nose Findings:    Dental:  Dental Findings: In tact   Chest/Lungs:  Chest/Lungs Findings: Normal Respiratory Rate     Heart/Vascular:  Heart Findings: Rate: Normal  Rhythm: Regular Rhythm        Mental Status:  Mental Status Findings:  Cooperative, Alert and Oriented         Anesthesia Plan  Type of Anesthesia, risks & benefits discussed:  Anesthesia Type:  general  Patient's Preference:   Intra-op Monitoring Plan:   Intra-op Monitoring Plan Comments:   Post Op Pain Control Plan: multimodal analgesia and peripheral nerve block  Post Op Pain Control Plan Comments:   Induction:   IV  Beta Blocker:  Patient is not currently on a Beta-Blocker (No further documentation required).       Informed Consent: Patient understands risks and agrees with Anesthesia plan.  Questions answered. Anesthesia consent signed with patient.  ASA Score: 2     Day of Surgery Review of History & Physical:  There are no significant changes.   H&P completed by Anesthesiologist.   Anesthesia Plan Notes: Pt is 9 days  "post-partum from a  under spinal anesthesia.  No HCG done because it will still show positive.  Pt reports "there is no possible way I could currently be pregnant" and has had no intercourse since delivery 9 days ago.  Pt is nursing a 9 day old in NICU at [facility].  We will review current pump & dump guidelines on discharge.    Discussed with Pt and  in Pre-op the latest data/paper on Anesthesia and nursing mothers from  from the [facility] website saying,   Pt is allowed to nurse in the PACU.  I advised her to pump and dump 6 hours after surgery then resume breastfeeding.  Ropivicaine nerve block is very low risk according to multiple sources including NIH and [employer].        Ready For Surgery From Anesthesia Perspective.       "

## 2018-05-30 NOTE — BRIEF OP NOTE
OPERATIVE NOTE    DATE OF SURGERY: 5/30/2018   TIME: 3:33 PM     PATIENT NAME: Cheryl Cardozo    PREOPERATIVE DIAGNOSIS: 1) Right distal fibula fracture  2) Right syndesmosis injury     POSTOPERATIVE DIAGNOSIS: 1) Right distal fibula fracture  2) Right syndesmosis injury     PROCEDURES: 1) Right ankle arthroscopy with removal of loose body 2) Open reduction internal fixation Right distal fibula fracture 3) Open reduction internal fixation of Right syndesmosis injury    SURGEON: Eliseo Rai MD    ASSISTANT: WAGNER Muñoz    ANESTHESIA: General endotracheal anesthesia    EBL: Less than 5 cc    COMPLICATIONS: None    SPECIMENS SENT: None.

## 2018-05-30 NOTE — DISCHARGE SUMMARY
[facility]  Discharge Note  Short Stay    Admit Date: 5/30/2018    Discharge Date and Time:  5/30/2018 3:42 PM     Attending Physician: Eliseo Rai MD     Discharge Provider: Eliseo Rai    Diagnoses:  Active Hospital Problems    Diagnosis  POA    *Closed fracture of right distal fibula [S82.831A]  Yes    Fracture [T14.8XXA]  Yes      Resolved Hospital Problems    Diagnosis Date Resolved POA   No resolved problems to display.       Discharged Condition: good    Hospital Course: Patient was admitted for an outpatient procedure and tolerated the procedure well with no complications.    Final Diagnoses: Same as principal problem.    Disposition: Home or Self Care    Follow up/Patient Instructions:    Medications:  Reconciled Home Medications:      Medication List      START taking these medications    ondansetron 8 MG Tbdl  Commonly known as:  ZOFRAN-ODT  Take 1 tablet (8 mg total) by mouth every 8 (eight) hours as needed.     oxyCODONE-acetaminophen  mg per tablet  Commonly known as:  PERCOCET  Take 1 tablet by mouth every 4 (four) hours as needed for Pain.        CONTINUE taking these medications    ALLEGRA ALLERGY 180 MG tablet  Generic drug:  fexofenadine  Take 180 mg by mouth once daily.     PRENATAL-1 ORAL  Take 1 tablet by mouth once daily at 6am.     VITAMIN C 500 MG tablet  Generic drug:  ascorbic acid (vitamin C)  Take 500 mg by mouth once daily.        STOP taking these medications    ibuprofen 200 MG tablet  Commonly known as:  ADVIL,MOTRIN            Discharge Procedure Orders  Diet general     Call MD for:  temperature >100.4     Call MD for:  persistent nausea and vomiting     Call MD for:  severe uncontrolled pain     Call MD for:  difficulty breathing, headache or visual disturbances     Call MD for:  redness, tenderness, or signs of infection (pain, swelling, redness, odor or green/yellow discharge around incision site)     Call MD for:  hives     Call MD for:   persistent dizziness or light-headedness     Call MD for:  extreme fatigue     No driving, operating heavy equipment or signing legal documents while taking pain medication     Non weight bearing     Keep surgical extremity elevated     Leave dressing on - Keep it clean, dry, and intact until clinic visit       Follow-up Information     Eliseo Rai MD In 2 weeks.    Specialty:  Orthopedic Surgery  Contact information:  [address] [facility]  [place] [address]  [phone]                   Discharge Procedure Orders (must include Diet, Follow-up, Activity):    Discharge Procedure Orders (must include Diet, Follow-up, Activity)  Diet general     Call MD for:  temperature >100.4     Call MD for:  persistent nausea and vomiting     Call MD for:  severe uncontrolled pain     Call MD for:  difficulty breathing, headache or visual disturbances     Call MD for:  redness, tenderness, or signs of infection (pain, swelling, redness, odor or green/yellow discharge around incision site)     Call MD for:  hives     Call MD for:  persistent dizziness or light-headedness     Call MD for:  extreme fatigue     No driving, operating heavy equipment or signing legal documents while taking pain medication     Non weight bearing     Keep surgical extremity elevated     Leave dressing on - Keep it clean, dry, and intact until clinic visit

## 2018-05-30 NOTE — OR NURSING
BIOM ZIP TIGHT FIXATION SYSTEM ANKLE SYNDESMOSIS TITANIUM IMPLANT REF#[id number] LOT#[id number] EXP. 2/02/2023

## 2018-05-30 NOTE — ANESTHESIA PROCEDURE NOTES
Right Popliteal Nerve Block    Patient location during procedure: pre-op   Block not for primary anesthetic.  Reason for block: at surgeon's request and post-op pain management   Post-op Pain Location: Right Ankle Pain  Start time: 5/30/2018 11:21 AM  Timeout: 5/30/2018 11:21 AM   End time: 5/30/2018 11:44 AM  Surgery related to: Right Fibula Fracture ORIF  Staffing  Anesthesiologist: KITTY MCMAHAN  Performed: anesthesiologist   Preanesthetic Checklist  Completed: patient identified, site marked, surgical consent, pre-op evaluation, timeout performed, IV checked, risks and benefits discussed and monitors and equipment checked  Peripheral Block  Patient position: left lateral decubitus  Prep: ChloraPrep and site prepped and draped  Patient monitoring: heart rate, cardiac monitor, continuous pulse ox, continuous capnometry and frequent blood pressure checks  Block type: popliteal  Laterality: right  Injection technique: continuous  Needle  Needle type: Tuohy   Needle gauge: 18 G  Needle length: 4 in  Needle localization: anatomical landmarks, nerve stimulator and ultrasound guidance  Needle insertion depth: 7 cm  Catheter type: spring wound  Catheter size: 20 G  Catheter at skin depth: 7 cm  Test dose: lidocaine 1.5% with Epi 1-to-200,000 and negative   -ultrasound image captured on disc.  Assessment  Injection assessment: negative aspiration, negative parasthesia and local visualized surrounding nerve  Paresthesia pain: none  Heart rate change: no  Slow fractionated injection: yes  Medications:  Bolus administered: 30 mL of 0.5 ropivacaine  Epinephrine added: none  Additional Notes  Decadron 4mg.    Great visualization of the nerve and LOT at 0.3mA.  Dosed with good visualization of nerve with medication spread above and below.  Catheter secured at 7cm.

## 2018-05-30 NOTE — OP NOTE
DATE OF SURGERY: 5/30/2018   TIME: 3:33 PM     PATIENT NAME: Cheryl Cardozo    PREOPERATIVE DIAGNOSIS: 1) Right distal fibula fracture  2) Right syndesmosis injury       POSTOPERATIVE DIAGNOSIS: 1) Right distal fibula fracture  2) Right syndesmosis injury     PROCEDURES: 1) Right ankle arthroscopy with removal of loose body 2) Open reduction internal fixation Right distal fibula fracture 3) Open reduction internal fixation of Right syndesmosis injury    SURGEON: Eliseo Rai MD    ASSISTANT: WAGNER Muñoz    ANESTHESIA: General endotracheal anesthesia    EBL: Less than 5 cc    COMPLICATIONS: None    SPECIMENS SENT: None.    PROCEDURE IN DETAIL:  After appropriate informed consent was obtained, patient was taken to the operating room and placed in the supine position on the operating table. The Right lower extremity was prepped and draped in the usual sterile fashion.  Tourniquet was raised to 350 mmHg after esmarch exsanguination of the limb.      The foot was placed in the ankle distractor and the ankle was distracted. The anteromedial portal for ankle arthroscopy was established using an 18 gauge spinal needle, injecting and aspirating back 10 cc of normal saline. A very small incision was made using a #11 blade just medial to the tibialis anterior tendon in line with the spinal needle insertion site through the skin only. A small hemostat was introduced and using to spread down to the capsule. The blunt trocar was placed into the joint. The camera was inserted and the ankle joint was inspected medially and laterally. There was good visualization of the joint, however, there was abundant fracture hematoma.     The anterolateral portal was established using 18 gauge spinal needle which was inserted into the lateral joint space while visualized using the camera. A very small incision as made using a #11 blade in line with the spinal needle insertion site through the skin only. The Linvatec 2.9 mm  shaver was placed and used to perofrm debridement of the synovitis. There was a small defect in the cartilage of the most posterior aspect of the tibia. One cartilaginous loose body was found and removed using grasper and shaver. The camera was switched to the lateral portal where the joint was again inspected. Last look was made through the medial and lateral gutters and the posterior ankle and then the trocar and the cameras were removed. The patient's leg was taken out of the well leg rodriguez.     Next an approximately, 7 cm incision was made overlying the midline of the distal fibula using a # 15 blade through the skin and subcutaneous tissue down to the level of the bone. Proximally, the incision was carried through the skin only in order to protect the superficial branch of the peroneal nerve.  The nerve was identified and retracted throughout the case. The fracture site was identified and cleared of osseous and soft tissue debris using browne rongeur and key elevator.  The fracture fragments were reduced using pointed reduction clamps.  AP/LAT/Mortise views of the left ankle under fluoroscopy showed  good reduction of the lateral malleolus.  A 20 mm x 3.5mm  lag screw was placed from anterior to posterior, proximal to distal in order to maintain reduction.  A Roberth distal fibular locking plate was placed on the lateral aspect of the fibula.  The first two screws inserted were non-locking screws, one proximal and one distal to the fracture site in order to bring the plate down to the bone.  The remainder of the screws placed distally were locking screws and proximally were non-locking screws.      The syndesmosis was stressed intraoperatively under flouroscopy using a pointed reduction clamp and noted to be unstable. The syndesmosis was reduced using a Mccoy clamp and one Kimberly ZipTite was placed through the plate in order to fixate the syndesmosis reduction.  AP/LAT/Mortise views of the right ankle under  fluoroscopy showed good reduction, with acceptable alignment and placement of screws.   The incisions were irrigated with normal saline. The deep layer of the lateral incision was re-approximated using 0 Monocryl in interrupted fashion, subcutaneous layer was re-approximated using 2.0 Monocryl in interrupted fashion, skin was re-approximated in  using 3.0 nylon in a running fashion. The portal sites were closed using 3.0 nylon sutures. Sterile dressing using Xeroform, bacitracin, 4 x 8's, ABD, cast padding, posterior splint and stirrups with the foot in neutral dorsiflexion was placed. Patient tolerated the procedure well without complications. I was present and scrubbed for the entire case.

## 2018-05-31 NOTE — ANESTHESIA POST-OP PAIN MANAGEMENT
Acute Pain Service Progress Note    hCeryl Cardozo is a 33 y.o., female, [id number].    Surgery:  Right Fibula ORIF    Post Op Day #: 1    Catheter type: perineural  popliteal    Infusion type: Ropivacaine 0.2%  10 basal    Problem List:    Active Hospital Problems    Diagnosis  POA    *Closed fracture of right distal fibula [S82.831A]  Yes    Fracture [T14.8XXA]  Yes      Resolved Hospital Problems    Diagnosis Date Resolved POA   No resolved problems to display.       Subjective:    Pt very comfortable with no pain at fibula.  Some anterior ankle pain relieved with PO meds.    Assessment:     Pain control adequate, NAASANJEEV, Lucy.    Plan:     Patient doing well, continue present treatment.    Evaluator De Womack

## 2018-06-01 NOTE — ADDENDUM NOTE
Addendum  created 06/01/18 1011 by Lukas Crowell MD    Anesthesia Event edited, Sign clinical note

## 2018-06-11 DIAGNOSIS — S82.831A OTHER CLOSED FRACTURE OF DISTAL END OF RIGHT FIBULA, INITIAL ENCOUNTER: Primary | ICD-10-CM

## 2018-06-12 ENCOUNTER — OFFICE VISIT (OUTPATIENT)
Dept: ORTHOPEDICS | Facility: CLINIC | Age: 33
End: 2018-06-12
Payer: OTHER GOVERNMENT

## 2018-06-12 ENCOUNTER — HOSPITAL ENCOUNTER (OUTPATIENT)
Dept: RADIOLOGY | Facility: HOSPITAL | Age: 33
Discharge: HOME | End: 2018-06-12
Attending: ORTHOPAEDIC SURGERY
Payer: OTHER GOVERNMENT

## 2018-06-12 VITALS — WEIGHT: 230 LBS | BODY MASS INDEX: 39.27 KG/M2 | HEIGHT: 64 IN

## 2018-06-12 DIAGNOSIS — S93.431A ANKLE SYNDESMOSIS DISRUPTION, RIGHT, INITIAL ENCOUNTER: ICD-10-CM

## 2018-06-12 DIAGNOSIS — S82.831A OTHER CLOSED FRACTURE OF DISTAL END OF RIGHT FIBULA, INITIAL ENCOUNTER: ICD-10-CM

## 2018-06-12 DIAGNOSIS — S82.831D OTHER CLOSED FRACTURE OF DISTAL END OF RIGHT FIBULA WITH ROUTINE HEALING, SUBSEQUENT ENCOUNTER: Primary | ICD-10-CM

## 2018-06-12 PROBLEM — T14.8XXA FRACTURE: Status: RESOLVED | Noted: 2018-05-29 | Resolved: 2018-06-12

## 2018-06-12 PROCEDURE — 29405 APPL SHORT LEG CAST: CPT | Mod: S$PBB,58,RT, | Performed by: ORTHOPAEDIC SURGERY

## 2018-06-12 PROCEDURE — 73610 X-RAY EXAM OF ANKLE: CPT | Mod: TC,PO,RT

## 2018-06-12 PROCEDURE — 29405 APPL SHORT LEG CAST: CPT | Mod: PBBFAC,PN | Performed by: ORTHOPAEDIC SURGERY

## 2018-06-12 PROCEDURE — 99212 OFFICE O/P EST SF 10 MIN: CPT | Mod: PBBFAC,25,PN | Performed by: ORTHOPAEDIC SURGERY

## 2018-06-12 PROCEDURE — 73610 X-RAY EXAM OF ANKLE: CPT | Mod: 26,RT,, | Performed by: RADIOLOGY

## 2018-06-12 PROCEDURE — 99024 POSTOP FOLLOW-UP VISIT: CPT | Mod: ,,, | Performed by: ORTHOPAEDIC SURGERY

## 2018-06-12 PROCEDURE — 99999 PR PBB SHADOW E&M-EST. PATIENT-LVL II: CPT | Mod: PBBFAC,,, | Performed by: ORTHOPAEDIC SURGERY

## 2018-06-12 NOTE — PROGRESS NOTES
Subjective:      Patient ID: Cheryl Cardozo is a 33 y.o. female.    Chief Complaint: Post-op Evaluation of the Right Ankle and Post-op Evaluation (s/p ATS with removal loose body; ORIF distal fibula & syndesmosis 5/30/18)    Doing well today. She rates her pain as 0/10 today.  Social History     Occupational History    Not on file.     Social History Main Topics    Smoking status: Former Smoker    Smokeless tobacco: Never Used      Comment: 5 years quit    Alcohol use Yes      Comment: rarely    Drug use: No    Sexual activity: Not on file            Objective:    Ortho Exam   RLE: neurovascularly intact, incisions healing well. +swelling. No signs of infection.       Assessment:       1. Other closed fracture of distal end of right fibula with routine healing, subsequent encounter    2. Ankle syndesmosis disruption, right, initial encounter          Plan:       Short leg cast applied. Non-weightbearing. F/u 2 weeks with xray of the right ankle out of plaster.

## 2018-06-25 DIAGNOSIS — S82.831D OTHER CLOSED FRACTURE OF DISTAL END OF RIGHT FIBULA WITH ROUTINE HEALING, SUBSEQUENT ENCOUNTER: Primary | ICD-10-CM

## 2018-06-26 ENCOUNTER — HOSPITAL ENCOUNTER (OUTPATIENT)
Dept: RADIOLOGY | Facility: HOSPITAL | Age: 33
Discharge: HOME | End: 2018-06-26
Attending: ORTHOPAEDIC SURGERY
Payer: OTHER GOVERNMENT

## 2018-06-26 ENCOUNTER — OFFICE VISIT (OUTPATIENT)
Dept: ORTHOPEDICS | Facility: CLINIC | Age: 33
End: 2018-06-26
Payer: OTHER GOVERNMENT

## 2018-06-26 VITALS
HEART RATE: 76 BPM | WEIGHT: 230 LBS | SYSTOLIC BLOOD PRESSURE: 103 MMHG | DIASTOLIC BLOOD PRESSURE: 69 MMHG | HEIGHT: 64 IN | BODY MASS INDEX: 39.27 KG/M2

## 2018-06-26 DIAGNOSIS — S93.431A ANKLE SYNDESMOSIS DISRUPTION, RIGHT, INITIAL ENCOUNTER: Primary | ICD-10-CM

## 2018-06-26 DIAGNOSIS — S82.831D OTHER CLOSED FRACTURE OF DISTAL END OF RIGHT FIBULA WITH ROUTINE HEALING, SUBSEQUENT ENCOUNTER: ICD-10-CM

## 2018-06-26 PROCEDURE — 99024 POSTOP FOLLOW-UP VISIT: CPT | Mod: S$PBB,,, | Performed by: ORTHOPAEDIC SURGERY

## 2018-06-26 PROCEDURE — 73610 X-RAY EXAM OF ANKLE: CPT | Mod: 26,RT,, | Performed by: RADIOLOGY

## 2018-06-26 PROCEDURE — 99999 PR PBB SHADOW E&M-EST. PATIENT-LVL III: CPT | Mod: PBBFAC,,, | Performed by: ORTHOPAEDIC SURGERY

## 2018-06-26 PROCEDURE — 73610 X-RAY EXAM OF ANKLE: CPT | Mod: TC,PO,RT

## 2018-06-26 PROCEDURE — 29405 APPL SHORT LEG CAST: CPT | Mod: S$PBB,58,RT, | Performed by: ORTHOPAEDIC SURGERY

## 2018-06-26 PROCEDURE — 99213 OFFICE O/P EST LOW 20 MIN: CPT | Mod: PBBFAC,25,PN | Performed by: ORTHOPAEDIC SURGERY

## 2018-06-26 PROCEDURE — 29405 APPL SHORT LEG CAST: CPT | Mod: PBBFAC,PN | Performed by: ORTHOPAEDIC SURGERY

## 2018-06-26 NOTE — PROGRESS NOTES
Subjective:      Patient ID: Cheryl Cardozo is a 33 y.o. female.    Chief Complaint: Post-op Evaluation of the Right Ankle and Post-op Evaluation (s/p ATS with removal of loose body; ORIF distal fibula & syndesmosis 5/30/18)    Doing well today. She rates her pain as 0/10 today.  Social History     Occupational History    Not on file.     Social History Main Topics    Smoking status: Former Smoker    Smokeless tobacco: Never Used      Comment: 5 years quit    Alcohol use Yes      Comment: rarely    Drug use: No    Sexual activity: Not on file            Objective:    Ortho Exam   RLE: neurovascularly intact, incisions healing well. Mild swelling. No signs of infection. Minimal  tenderness to palpation       XRAYS: 3 views of right ankle obtained and reviewed today reveal  Interval progression of healing. Good alignment. Hardware is intact  .       Assessment:       1. Ankle syndesmosis disruption, right, initial encounter    2. Other closed fracture of distal end of right fibula with routine healing, subsequent encounter          Plan:       Short leg cast applied. Non-weightbearing. F/u 2 weeks with xray of the right ankle out of plaster.

## 2018-07-09 DIAGNOSIS — S93.431A ANKLE SYNDESMOSIS DISRUPTION, RIGHT, INITIAL ENCOUNTER: Primary | ICD-10-CM

## 2018-07-12 ENCOUNTER — HOSPITAL ENCOUNTER (OUTPATIENT)
Dept: RADIOLOGY | Facility: HOSPITAL | Age: 33
Discharge: HOME | End: 2018-07-12
Attending: ORTHOPAEDIC SURGERY
Payer: OTHER GOVERNMENT

## 2018-07-12 ENCOUNTER — OFFICE VISIT (OUTPATIENT)
Dept: ORTHOPEDICS | Facility: CLINIC | Age: 33
End: 2018-07-12
Payer: OTHER GOVERNMENT

## 2018-07-12 VITALS
DIASTOLIC BLOOD PRESSURE: 74 MMHG | BODY MASS INDEX: 39.27 KG/M2 | HEART RATE: 69 BPM | SYSTOLIC BLOOD PRESSURE: 109 MMHG | WEIGHT: 230 LBS | HEIGHT: 64 IN

## 2018-07-12 DIAGNOSIS — S93.431A ANKLE SYNDESMOSIS DISRUPTION, RIGHT, INITIAL ENCOUNTER: ICD-10-CM

## 2018-07-12 DIAGNOSIS — S82.831D OTHER CLOSED FRACTURE OF DISTAL END OF RIGHT FIBULA WITH ROUTINE HEALING, SUBSEQUENT ENCOUNTER: Primary | ICD-10-CM

## 2018-07-12 PROCEDURE — 73610 X-RAY EXAM OF ANKLE: CPT | Mod: TC,PO,RT

## 2018-07-12 PROCEDURE — 97760 ORTHOTIC MGMT&TRAING 1ST ENC: CPT | Mod: PBBFAC,PN | Performed by: ORTHOPAEDIC SURGERY

## 2018-07-12 PROCEDURE — 99999 PR PBB SHADOW E&M-EST. PATIENT-LVL III: CPT | Mod: PBBFAC,,, | Performed by: ORTHOPAEDIC SURGERY

## 2018-07-12 PROCEDURE — 99024 POSTOP FOLLOW-UP VISIT: CPT | Mod: ,,, | Performed by: ORTHOPAEDIC SURGERY

## 2018-07-12 PROCEDURE — 73610 X-RAY EXAM OF ANKLE: CPT | Mod: 26,RT,, | Performed by: RADIOLOGY

## 2018-07-12 PROCEDURE — 99213 OFFICE O/P EST LOW 20 MIN: CPT | Mod: PBBFAC,25,PN | Performed by: ORTHOPAEDIC SURGERY

## 2018-07-12 NOTE — PROGRESS NOTES
Subjective:      Patient ID: Cheryl Cardozo is a 33 y.o. female.    Chief Complaint: Post-op Evaluation of the Right Ankle and Post-op Evaluation (s/p ATS with debridement; ORIF distal fibula & syndesmosis 5/30/18)    Doing well today. She rates her pain as 0/10 today.  Social History     Occupational History    Not on file.     Social History Main Topics    Smoking status: Former Smoker    Smokeless tobacco: Never Used      Comment: 5 years quit    Alcohol use Yes      Comment: rarely    Drug use: No    Sexual activity: Not on file            Objective:    Ortho Exam   RLE: neurovascularly intact, incisions healing well. Minimal swelling. No signs of infection.  No tenderness to palpation.      XRAYS: 3 views of right ankle obtained and reviewed today reveal  Interval progression of healing. Good alignment. Hardware is intact .      Assessment:       1. Other closed fracture of distal end of right fibula with routine healing, subsequent encounter    2. Ankle syndesmosis disruption, right, initial encounter          Plan:       We performed a custom orthotic/brace adjustment, fitting and training with the patient today. The patient demonstrated understanding and proper care. This was performed for 15 minutes.  Short boot was given.   F/u 2 weeks with xray of the right ankle. PT 2/6.

## 2018-07-16 ENCOUNTER — PATIENT MESSAGE (OUTPATIENT)
Dept: FAMILY MEDICINE | Facility: CLINIC | Age: 33
End: 2018-07-16

## 2018-07-17 ENCOUNTER — TELEPHONE (OUTPATIENT)
Dept: FAMILY MEDICINE | Facility: CLINIC | Age: 33
End: 2018-07-17

## 2018-07-17 ENCOUNTER — PATIENT MESSAGE (OUTPATIENT)
Dept: ORTHOPEDICS | Facility: CLINIC | Age: 33
End: 2018-07-17

## 2018-07-17 ENCOUNTER — TELEPHONE (OUTPATIENT)
Dept: ORTHOPEDICS | Facility: CLINIC | Age: 33
End: 2018-07-17

## 2018-07-17 DIAGNOSIS — S93.431A ANKLE SYNDESMOSIS DISRUPTION, RIGHT, INITIAL ENCOUNTER: ICD-10-CM

## 2018-07-17 DIAGNOSIS — S82.301D CLOSED FRACTURE OF DISTAL END OF FIBULA WITH TIBIA, RIGHT, WITH ROUTINE HEALING, SUBSEQUENT ENCOUNTER: Primary | ICD-10-CM

## 2018-07-17 DIAGNOSIS — S82.831D CLOSED FRACTURE OF DISTAL END OF FIBULA WITH TIBIA, RIGHT, WITH ROUTINE HEALING, SUBSEQUENT ENCOUNTER: Primary | ICD-10-CM

## 2018-07-17 DIAGNOSIS — S82.831D OTHER CLOSED FRACTURE OF DISTAL END OF RIGHT FIBULA WITH ROUTINE HEALING, SUBSEQUENT ENCOUNTER: Primary | ICD-10-CM

## 2018-07-17 NOTE — TELEPHONE ENCOUNTER
----- Message from Rajani Peoples sent at 7/17/2018  2:24 PM CDT -----  Contact: nathalie/[facility] [phone]  States that she is calling to check status on referral from [facility]. Please call back at [phone]//thank you acc

## 2018-07-20 ENCOUNTER — TELEPHONE (OUTPATIENT)
Dept: ORTHOPEDICS | Facility: CLINIC | Age: 33
End: 2018-07-20

## 2018-07-20 NOTE — TELEPHONE ENCOUNTER
----- Message from Verena Mendez sent at 7/20/2018  1:40 PM CDT -----  Contact: Jennifer with [facility] with [facility] calling to speak with office regarding this patients referral for physical therapy. She is calling to advise that they would need a prior authorization sent to [facility] and the orders to be faxed to their office. They will need that authorizaiton and the order faxed over in order to schedule the appt. Please call back and advise.     Call back# [phone]  Fax# [phone]  Thanks

## 2018-07-25 DIAGNOSIS — S82.831D OTHER CLOSED FRACTURE OF DISTAL END OF RIGHT FIBULA WITH ROUTINE HEALING, SUBSEQUENT ENCOUNTER: Primary | ICD-10-CM

## 2018-07-26 ENCOUNTER — HOSPITAL ENCOUNTER (OUTPATIENT)
Dept: RADIOLOGY | Facility: HOSPITAL | Age: 33
Discharge: HOME | End: 2018-07-26
Attending: ORTHOPAEDIC SURGERY
Payer: OTHER GOVERNMENT

## 2018-07-26 ENCOUNTER — OFFICE VISIT (OUTPATIENT)
Dept: ORTHOPEDICS | Facility: CLINIC | Age: 33
End: 2018-07-26
Payer: OTHER GOVERNMENT

## 2018-07-26 VITALS
DIASTOLIC BLOOD PRESSURE: 61 MMHG | BODY MASS INDEX: 39.26 KG/M2 | WEIGHT: 229.94 LBS | HEIGHT: 64 IN | HEART RATE: 71 BPM | SYSTOLIC BLOOD PRESSURE: 129 MMHG

## 2018-07-26 DIAGNOSIS — S93.431A ANKLE SYNDESMOSIS DISRUPTION, RIGHT, INITIAL ENCOUNTER: ICD-10-CM

## 2018-07-26 DIAGNOSIS — S82.831D OTHER CLOSED FRACTURE OF DISTAL END OF RIGHT FIBULA WITH ROUTINE HEALING, SUBSEQUENT ENCOUNTER: ICD-10-CM

## 2018-07-26 DIAGNOSIS — S82.831D OTHER CLOSED FRACTURE OF DISTAL END OF RIGHT FIBULA WITH ROUTINE HEALING, SUBSEQUENT ENCOUNTER: Primary | ICD-10-CM

## 2018-07-26 PROCEDURE — 99213 OFFICE O/P EST LOW 20 MIN: CPT | Mod: PBBFAC,25,PN | Performed by: ORTHOPAEDIC SURGERY

## 2018-07-26 PROCEDURE — 99999 PR PBB SHADOW E&M-EST. PATIENT-LVL III: CPT | Mod: PBBFAC,,, | Performed by: ORTHOPAEDIC SURGERY

## 2018-07-26 PROCEDURE — 99024 POSTOP FOLLOW-UP VISIT: CPT | Mod: ,,, | Performed by: ORTHOPAEDIC SURGERY

## 2018-07-26 PROCEDURE — 73610 X-RAY EXAM OF ANKLE: CPT | Mod: 26,RT,, | Performed by: RADIOLOGY

## 2018-07-26 PROCEDURE — 73610 X-RAY EXAM OF ANKLE: CPT | Mod: TC,PO,RT

## 2018-07-27 NOTE — PROGRESS NOTES
Subjective:      Patient ID: Cheryl Cardozo is a 33 y.o. female.    Chief Complaint: Post-op Evaluation of the Right Ankle    Doing well today. She rates her pain as 2/10 today. She is doing well with PT. Pt is walking in a tennis shoe.   Social History     Occupational History    Not on file.     Social History Main Topics    Smoking status: Former Smoker    Smokeless tobacco: Never Used      Comment: 5 years quit    Alcohol use Yes      Comment: rarely    Drug use: No    Sexual activity: Not on file            Objective:    Ortho Exam   RLE: neurovascularly intact, incisions healing well. Minimal swelling. No signs of infection.  No tenderness to palpation.      XRAYS: 3 views of right ankle obtained and reviewed today reveal  Interval progression of healing. Good alignment. Hardware is intact .      Assessment:         s/p ORIF right trimalleolar ankle fracture DOS: 5/30/18  Plan:       Continue PT.  F/u  weeks with xray of the right ankle.

## 2018-07-31 ENCOUNTER — PATIENT MESSAGE (OUTPATIENT)
Dept: ORTHOPEDICS | Facility: CLINIC | Age: 33
End: 2018-07-31

## 2018-08-21 DIAGNOSIS — M25.571 RIGHT ANKLE PAIN, UNSPECIFIED CHRONICITY: Primary | ICD-10-CM

## 2018-08-23 ENCOUNTER — HOSPITAL ENCOUNTER (OUTPATIENT)
Dept: RADIOLOGY | Facility: HOSPITAL | Age: 33
Discharge: HOME | End: 2018-08-23
Attending: ORTHOPAEDIC SURGERY
Payer: OTHER GOVERNMENT

## 2018-08-23 ENCOUNTER — OFFICE VISIT (OUTPATIENT)
Dept: ORTHOPEDICS | Facility: CLINIC | Age: 33
End: 2018-08-23
Payer: OTHER GOVERNMENT

## 2018-08-23 VITALS
SYSTOLIC BLOOD PRESSURE: 114 MMHG | HEIGHT: 64 IN | BODY MASS INDEX: 39.26 KG/M2 | DIASTOLIC BLOOD PRESSURE: 77 MMHG | WEIGHT: 229.94 LBS | HEART RATE: 77 BPM

## 2018-08-23 DIAGNOSIS — S82.831D OTHER CLOSED FRACTURE OF DISTAL END OF RIGHT FIBULA WITH ROUTINE HEALING, SUBSEQUENT ENCOUNTER: Primary | ICD-10-CM

## 2018-08-23 DIAGNOSIS — M25.571 RIGHT ANKLE PAIN, UNSPECIFIED CHRONICITY: ICD-10-CM

## 2018-08-23 DIAGNOSIS — S93.431A ANKLE SYNDESMOSIS DISRUPTION, RIGHT, INITIAL ENCOUNTER: ICD-10-CM

## 2018-08-23 PROCEDURE — 99024 POSTOP FOLLOW-UP VISIT: CPT | Mod: ,,, | Performed by: ORTHOPAEDIC SURGERY

## 2018-08-23 PROCEDURE — 99999 PR PBB SHADOW E&M-EST. PATIENT-LVL III: CPT | Mod: PBBFAC,,, | Performed by: ORTHOPAEDIC SURGERY

## 2018-08-23 PROCEDURE — 73610 X-RAY EXAM OF ANKLE: CPT | Mod: 26,RT,, | Performed by: RADIOLOGY

## 2018-08-23 PROCEDURE — 73610 X-RAY EXAM OF ANKLE: CPT | Mod: TC,PO,RT

## 2018-08-23 PROCEDURE — 99213 OFFICE O/P EST LOW 20 MIN: CPT | Mod: PBBFAC,25,PN | Performed by: ORTHOPAEDIC SURGERY

## 2018-08-23 NOTE — PROGRESS NOTES
Subjective:      Patient ID: Cheryl Cardozo is a 33 y.o. female.    Chief Complaint: Pain, Swelling, and Weakness of the Right Ankle    Doing well today. She rates her pain as 2/10 today. She is doing well with PT. Pt is walking in a tennis shoe. She says it still feels weak and swells.   Social History     Occupational History    Not on file   Tobacco Use    Smoking status: Former Smoker    Smokeless tobacco: Never Used    Tobacco comment: 5 years quit   Substance and Sexual Activity    Alcohol use: Yes     Comment: rarely    Drug use: No    Sexual activity: Not on file            Objective:    Ortho Exam   RLE: neurovascularly intact, incisions healing well. Minimal swelling. No signs of infection.  No tenderness to palpation. Negative anterior drawer test.       XRAYS: 3 views of right ankle obtained and reviewed today reveal fractures are well healed. Good alignment. Hardware is intact .      Assessment:         s/p ORIF right trimalleolar ankle fracture DOS: 5/30/18  Plan:       Continue PT.  I told her that the swelling and weakness will improve with time.  F/u prn.

## 2018-11-29 ENCOUNTER — TELEPHONE (OUTPATIENT)
Dept: FAMILY MEDICINE | Facility: CLINIC | Age: 33
End: 2018-11-29

## 2018-11-29 NOTE — TELEPHONE ENCOUNTER
----- Message from Rolanda Gaitan sent at 11/29/2018 10:39 AM CST -----  Contact: pt  Pt stated she will like to be seen today, she has a fever and rash on neck and shoulder on left side, she can be reached at [phone] Thanks

## 2018-12-04 ENCOUNTER — OFFICE VISIT (OUTPATIENT)
Dept: FAMILY MEDICINE | Facility: CLINIC | Age: 33
End: 2018-12-04
Payer: OTHER GOVERNMENT

## 2018-12-04 VITALS
SYSTOLIC BLOOD PRESSURE: 127 MMHG | WEIGHT: 266 LBS | DIASTOLIC BLOOD PRESSURE: 82 MMHG | TEMPERATURE: 98 F | HEART RATE: 78 BPM | HEIGHT: 64 IN | BODY MASS INDEX: 45.41 KG/M2

## 2018-12-04 DIAGNOSIS — R00.2 PALPITATIONS: ICD-10-CM

## 2018-12-04 DIAGNOSIS — R21 RASH: ICD-10-CM

## 2018-12-04 DIAGNOSIS — T78.40XD ALLERGIC REACTION, SUBSEQUENT ENCOUNTER: Primary | ICD-10-CM

## 2018-12-04 PROCEDURE — 99213 OFFICE O/P EST LOW 20 MIN: CPT | Mod: PBBFAC,PO | Performed by: NURSE PRACTITIONER

## 2018-12-04 PROCEDURE — 99214 OFFICE O/P EST MOD 30 MIN: CPT | Mod: S$PBB,,, | Performed by: NURSE PRACTITIONER

## 2018-12-04 PROCEDURE — 99999 PR PBB SHADOW E&M-EST. PATIENT-LVL III: CPT | Mod: PBBFAC,,, | Performed by: NURSE PRACTITIONER

## 2018-12-04 RX ORDER — METHYLPREDNISOLONE 4 MG/1
TABLET ORAL
Qty: 1 PACKAGE | Refills: 0 | Status: SHIPPED | OUTPATIENT
Start: 2018-12-04 | End: 2018-12-25

## 2018-12-04 RX ORDER — TRIAMCINOLONE ACETONIDE 5 MG/G
CREAM TOPICAL 2 TIMES DAILY
Qty: 30 G | Refills: 0 | Status: SHIPPED | OUTPATIENT
Start: 2018-12-04 | End: 2019-07-18

## 2018-12-04 RX ORDER — HYDROXYZINE HYDROCHLORIDE 25 MG/1
25 TABLET, FILM COATED ORAL 2 TIMES DAILY PRN
Qty: 14 TABLET | Refills: 0 | Status: SHIPPED | OUTPATIENT
Start: 2018-12-04 | End: 2018-12-11

## 2018-12-04 NOTE — PROGRESS NOTES
Subjective:       Patient ID: Cheryl Cardozo is a 33 y.o. female.    Chief Complaint: Rash (neck, shoulder )    Rash   This is a new problem. The current episode started in the past 7 days. The problem has been gradually improving since onset. The affected locations include the neck and chest. The rash is characterized by redness and itchiness. It is unknown if there was an exposure to a precipitant. Pertinent negatives include no anorexia, congestion, cough, diarrhea, eye pain, facial edema, fatigue, fever, joint pain, nail changes, rhinorrhea, shortness of breath, sore throat or vomiting. Past treatments include anti-itch cream and antihistamine. The treatment provided no relief. There is no history of allergies, asthma, eczema or varicella.   Palpitations    This is a recurrent (States has been having since giving birth to her first child ) problem. The current episode started more than 1 month ago. The problem occurs intermittently. The problem has been unchanged. Pertinent negatives include no anxiety, chest fullness, chest pain, coughing, diaphoresis, dizziness, fever, irregular heartbeat, malaise/fatigue, nausea, near-syncope, numbness, shortness of breath, syncope, vomiting or weakness. She has tried nothing for the symptoms. The treatment provided no relief. Risk factors include obesity. There is no history of anemia, anxiety, drug use, heart disease, hyperthyroidism or a valve disorder.     Past Medical History:   Diagnosis Date    ADD (attention deficit disorder)     Morbid obesity 12/4/2017    Obesity     Plantar fasciitis 5/30/2012     Social History     Socioeconomic History    Marital status:      Spouse name: Not on file    Number of children: Not on file    Years of education: Not on file    Highest education level: Not on file   Social Needs    Financial resource strain: Not on file    Food insecurity - worry: Not on file    Food insecurity - inability: Not on file     Transportation needs - medical: Not on file    Transportation needs - non-medical: Not on file   Occupational History    Not on file   Tobacco Use    Smoking status: Former Smoker    Smokeless tobacco: Never Used    Tobacco comment: 5 years quit   Substance and Sexual Activity    Alcohol use: Yes     Comment: rarely    Drug use: No    Sexual activity: Not on file   Other Topics Concern    Not on file   Social History Narrative    Not on file     Past Surgical History:   Procedure Laterality Date    addenoids      ARTHROSCOPY OF ANKLE Right 2018    Procedure: ARTHROSCOPY, ANKLE;  Surgeon: Eliseo Rai MD;  Location: [facility] OR;  Service: Orthopedics;  Laterality: Right;    ARTHROSCOPY, ANKLE Right 2018    Performed by Eliseo Rai MD at [facility] OR     SECTION  02/04/2016    x2    FIXATION OF SYNDESMOSIS OF ANKLE Right 2018    Procedure: FIXATION, SYNDESMOSIS, ANKLE;  Surgeon: Eliseo Rai MD;  Location: [facility] OR;  Service: Orthopedics;  Laterality: Right;    FIXATION, SYNDESMOSIS, ANKLE Right 2018    Performed by [facility]o Rai MD at [facility] OR    ORIF FIBULA FRACTURE Right 2018    Procedure: ORIF, FRACTURE, FIBULA;  Surgeon: Eliseo Rai MD;  Location: [facility] OR;  Service: Orthopedics;  Laterality: Right;    ORIF, FRACTURE, FIBULA Right 2018    Performed by El[facility]o Rai MD at [facility] OR    TONSILLECTOMY, ADENOIDECTOMY         Review of Systems   Constitutional: Negative.  Negative for diaphoresis, fatigue, fever and malaise/fatigue.   HENT: Negative.  Negative for congestion, rhinorrhea and sore throat.    Eyes: Negative.  Negative for pain.   Respiratory: Negative.  Negative for cough and shortness of breath.    Cardiovascular: Positive for palpitations. Negative for chest pain, syncope and near-syncope.   Gastrointestinal: Negative.  Negative for anorexia, diarrhea, nausea and vomiting.   Endocrine: Negative.    Genitourinary: Negative.     Musculoskeletal: Negative.  Negative for joint pain.   Skin: Positive for rash. Negative for nail changes.   Allergic/Immunologic: Negative.    Neurological: Negative for dizziness, weakness and numbness.   Psychiatric/Behavioral: Negative.  The patient is not nervous/anxious.        Objective:      Physical Exam   Constitutional: She is oriented to person, place, and time. She appears well-developed and well-nourished.   HENT:   Head: Normocephalic.   Right Ear: External ear normal.   Left Ear: External ear normal.   Nose: Nose normal.   Mouth/Throat: Oropharynx is clear and moist.   Eyes: Conjunctivae are normal. Pupils are equal, round, and reactive to light.   Neck: Normal range of motion. Neck supple.   Cardiovascular: Normal rate, regular rhythm and normal heart sounds.   Pulmonary/Chest: Effort normal and breath sounds normal.   Abdominal: Soft. Bowel sounds are normal.   Musculoskeletal: Normal range of motion.   Neurological: She is alert and oriented to person, place, and time.   Skin: Skin is warm and dry. Capillary refill takes 2 to 3 seconds. Rash (neck, upper chest; flat, erythematous, pruritic) noted.   Psychiatric: She has a normal mood and affect. Her behavior is normal. Judgment and thought content normal.   Nursing note and vitals reviewed.      Assessment:       1. Allergic reaction, subsequent encounter    2. Rash    3. Palpitations        Plan:           Cheryl was seen today for rash.    Diagnoses and all orders for this visit:    Allergic reaction, subsequent encounter  Rash  -     methylPREDNISolone (MEDROL DOSEPACK) 4 mg tablet; use as directed  -     triamcinolone acetonide 0.5% (KENALOG) 0.5 % Crea; Apply topically 2 (two) times daily. for 7 days  -     hydrOXYzine HCl (ATARAX) 25 MG tablet; Take 1 tablet (25 mg total) by mouth 2 (two) times daily as needed for Itching.      Palpitations  -     IN OFFICE EKG 12-LEAD (to Muse)  -     Holter monitor - 24 hour; Future  -     CBC auto  differential; Future  -     TSH; Future  -     Basic metabolic panel; Future      Report to ER immediately if symptoms worsen

## 2018-12-05 ENCOUNTER — TELEPHONE (OUTPATIENT)
Dept: CARDIOLOGY | Facility: CLINIC | Age: 33
End: 2018-12-05

## 2018-12-11 ENCOUNTER — LAB VISIT (OUTPATIENT)
Dept: LAB | Facility: HOSPITAL | Age: 33
End: 2018-12-11
Attending: FAMILY MEDICINE
Payer: OTHER GOVERNMENT

## 2018-12-11 DIAGNOSIS — R00.2 PALPITATIONS: ICD-10-CM

## 2018-12-11 LAB
ANION GAP SERPL CALC-SCNC: 9 MMOL/L
BASOPHILS # BLD AUTO: 0.05 K/UL
BASOPHILS NFR BLD: 0.4 %
BUN SERPL-MCNC: 15 MG/DL
CALCIUM SERPL-MCNC: 9.1 MG/DL
CHLORIDE SERPL-SCNC: 103 MMOL/L
CO2 SERPL-SCNC: 27 MMOL/L
CREAT SERPL-MCNC: 0.8 MG/DL
DIFFERENTIAL METHOD: ABNORMAL
EOSINOPHIL # BLD AUTO: 0.3 K/UL
EOSINOPHIL NFR BLD: 1.9 %
ERYTHROCYTE [DISTWIDTH] IN BLOOD BY AUTOMATED COUNT: 12.9 %
EST. GFR  (AFRICAN AMERICAN): >60 ML/MIN/1.73 M^2
EST. GFR  (NON AFRICAN AMERICAN): >60 ML/MIN/1.73 M^2
GLUCOSE SERPL-MCNC: 97 MG/DL
HCT VFR BLD AUTO: 42.4 %
HGB BLD-MCNC: 13.8 G/DL
IMM GRANULOCYTES # BLD AUTO: 0.04 K/UL
IMM GRANULOCYTES NFR BLD AUTO: 0.3 %
LYMPHOCYTES # BLD AUTO: 2.5 K/UL
LYMPHOCYTES NFR BLD: 19.3 %
MCH RBC QN AUTO: 29.4 PG
MCHC RBC AUTO-ENTMCNC: 32.5 G/DL
MCV RBC AUTO: 90 FL
MONOCYTES # BLD AUTO: 0.9 K/UL
MONOCYTES NFR BLD: 7.1 %
NEUTROPHILS # BLD AUTO: 9.2 K/UL
NEUTROPHILS NFR BLD: 71 %
NRBC BLD-RTO: 0 /100 WBC
PLATELET # BLD AUTO: 344 K/UL
PMV BLD AUTO: 10.1 FL
POTASSIUM SERPL-SCNC: 3.7 MMOL/L
RBC # BLD AUTO: 4.69 M/UL
SODIUM SERPL-SCNC: 139 MMOL/L
TSH SERPL DL<=0.005 MIU/L-ACNC: 0.86 UIU/ML
WBC # BLD AUTO: 12.89 K/UL

## 2018-12-11 PROCEDURE — 36415 COLL VENOUS BLD VENIPUNCTURE: CPT | Mod: PO

## 2018-12-11 PROCEDURE — 80048 BASIC METABOLIC PNL TOTAL CA: CPT

## 2018-12-11 PROCEDURE — 84443 ASSAY THYROID STIM HORMONE: CPT

## 2018-12-11 PROCEDURE — 85025 COMPLETE CBC W/AUTO DIFF WBC: CPT

## 2018-12-13 ENCOUNTER — CLINICAL SUPPORT (OUTPATIENT)
Dept: FAMILY MEDICINE | Facility: CLINIC | Age: 33
End: 2018-12-13
Payer: OTHER GOVERNMENT

## 2018-12-13 DIAGNOSIS — R00.2 PALPITATIONS: Primary | ICD-10-CM

## 2018-12-13 PROCEDURE — 93005 ELECTROCARDIOGRAM TRACING: CPT | Mod: PBBFAC,PO | Performed by: NURSE PRACTITIONER

## 2018-12-13 PROCEDURE — 93010 ELECTROCARDIOGRAM REPORT: CPT | Mod: ,,, | Performed by: INTERNAL MEDICINE

## 2019-03-18 ENCOUNTER — PATIENT MESSAGE (OUTPATIENT)
Dept: FAMILY MEDICINE | Facility: CLINIC | Age: 34
End: 2019-03-18

## 2019-03-18 DIAGNOSIS — Z00.00 ROUTINE ADULT HEALTH MAINTENANCE: Primary | ICD-10-CM

## 2019-03-19 ENCOUNTER — TELEPHONE (OUTPATIENT)
Dept: FAMILY MEDICINE | Facility: CLINIC | Age: 34
End: 2019-03-19

## 2019-03-19 DIAGNOSIS — D72.829 LEUKOCYTOSIS, UNSPECIFIED TYPE: Primary | ICD-10-CM

## 2019-03-19 NOTE — TELEPHONE ENCOUNTER
Was due repeat CBC, for elevated WBCs, in December, do you still want this.  Health maintenance shows lipids due, if she needs CBC, I can get lipid order from PCP so she can do one trip

## 2019-03-25 ENCOUNTER — LAB VISIT (OUTPATIENT)
Dept: LAB | Facility: HOSPITAL | Age: 34
End: 2019-03-25
Attending: FAMILY MEDICINE
Payer: OTHER GOVERNMENT

## 2019-03-25 DIAGNOSIS — Z00.00 ROUTINE ADULT HEALTH MAINTENANCE: ICD-10-CM

## 2019-03-25 DIAGNOSIS — D72.829 LEUKOCYTOSIS, UNSPECIFIED TYPE: ICD-10-CM

## 2019-03-25 LAB
BASOPHILS # BLD AUTO: 0.04 K/UL (ref 0–0.2)
BASOPHILS NFR BLD: 0.4 % (ref 0–1.9)
CHOLEST SERPL-MCNC: 136 MG/DL (ref 120–199)
CHOLEST/HDLC SERPL: 3.4 {RATIO} (ref 2–5)
DIFFERENTIAL METHOD: ABNORMAL
EOSINOPHIL # BLD AUTO: 0.5 K/UL (ref 0–0.5)
EOSINOPHIL NFR BLD: 5.6 % (ref 0–8)
ERYTHROCYTE [DISTWIDTH] IN BLOOD BY AUTOMATED COUNT: 12.9 % (ref 11.5–14.5)
HCT VFR BLD AUTO: 44.9 % (ref 37–48.5)
HDLC SERPL-MCNC: 40 MG/DL (ref 40–75)
HDLC SERPL: 29.4 % (ref 20–50)
HGB BLD-MCNC: 14.1 G/DL (ref 12–16)
IMM GRANULOCYTES # BLD AUTO: 0.03 K/UL (ref 0–0.04)
IMM GRANULOCYTES NFR BLD AUTO: 0.3 % (ref 0–0.5)
LDLC SERPL CALC-MCNC: 84.4 MG/DL (ref 63–159)
LYMPHOCYTES # BLD AUTO: 1.6 K/UL (ref 1–4.8)
LYMPHOCYTES NFR BLD: 17.9 % (ref 18–48)
MCH RBC QN AUTO: 28.8 PG (ref 27–31)
MCHC RBC AUTO-ENTMCNC: 31.4 G/DL (ref 32–36)
MCV RBC AUTO: 92 FL (ref 82–98)
MONOCYTES # BLD AUTO: 0.7 K/UL (ref 0.3–1)
MONOCYTES NFR BLD: 7.5 % (ref 4–15)
NEUTROPHILS # BLD AUTO: 6.1 K/UL (ref 1.8–7.7)
NEUTROPHILS NFR BLD: 68.3 % (ref 38–73)
NONHDLC SERPL-MCNC: 96 MG/DL
NRBC BLD-RTO: 0 /100 WBC
PLATELET # BLD AUTO: 331 K/UL (ref 150–350)
PMV BLD AUTO: 9.9 FL (ref 9.2–12.9)
RBC # BLD AUTO: 4.9 M/UL (ref 4–5.4)
TRIGL SERPL-MCNC: 58 MG/DL (ref 30–150)
WBC # BLD AUTO: 8.92 K/UL (ref 3.9–12.7)

## 2019-03-25 PROCEDURE — 85025 COMPLETE CBC W/AUTO DIFF WBC: CPT

## 2019-03-25 PROCEDURE — 36415 COLL VENOUS BLD VENIPUNCTURE: CPT | Mod: PO

## 2019-03-25 PROCEDURE — 80061 LIPID PANEL: CPT

## 2019-03-26 ENCOUNTER — PATIENT MESSAGE (OUTPATIENT)
Dept: FAMILY MEDICINE | Facility: CLINIC | Age: 34
End: 2019-03-26

## 2019-07-18 ENCOUNTER — OFFICE VISIT (OUTPATIENT)
Dept: FAMILY MEDICINE | Facility: CLINIC | Age: 34
End: 2019-07-18
Payer: COMMERCIAL

## 2019-07-18 ENCOUNTER — TELEPHONE (OUTPATIENT)
Dept: FAMILY MEDICINE | Facility: CLINIC | Age: 34
End: 2019-07-18

## 2019-07-18 VITALS
HEART RATE: 91 BPM | TEMPERATURE: 98 F | HEIGHT: 64 IN | WEIGHT: 283 LBS | BODY MASS INDEX: 48.32 KG/M2 | DIASTOLIC BLOOD PRESSURE: 78 MMHG | SYSTOLIC BLOOD PRESSURE: 118 MMHG

## 2019-07-18 DIAGNOSIS — Z76.0 MEDICATION REFILL: ICD-10-CM

## 2019-07-18 DIAGNOSIS — F98.8 ATTENTION DEFICIT DISORDER, UNSPECIFIED HYPERACTIVITY PRESENCE: ICD-10-CM

## 2019-07-18 DIAGNOSIS — Z00.00 ANNUAL PHYSICAL EXAM: Primary | ICD-10-CM

## 2019-07-18 DIAGNOSIS — F98.8 ATTENTION DEFICIT DISORDER, UNSPECIFIED HYPERACTIVITY PRESENCE: Primary | ICD-10-CM

## 2019-07-18 PROCEDURE — 99395 PREV VISIT EST AGE 18-39: CPT | Mod: S$GLB,,, | Performed by: NURSE PRACTITIONER

## 2019-07-18 PROCEDURE — 99999 PR PBB SHADOW E&M-EST. PATIENT-LVL III: CPT | Mod: PBBFAC,,, | Performed by: NURSE PRACTITIONER

## 2019-07-18 PROCEDURE — 99213 OFFICE O/P EST LOW 20 MIN: CPT | Mod: PBBFAC,PO | Performed by: NURSE PRACTITIONER

## 2019-07-18 PROCEDURE — 99395 PR PREVENTIVE VISIT,EST,18-39: ICD-10-PCS | Mod: S$GLB,,, | Performed by: NURSE PRACTITIONER

## 2019-07-18 PROCEDURE — 99999 PR PBB SHADOW E&M-EST. PATIENT-LVL III: ICD-10-PCS | Mod: PBBFAC,,, | Performed by: NURSE PRACTITIONER

## 2019-07-18 NOTE — TELEPHONE ENCOUNTER
Dr. Ornelas,  This pt wants to restart Adderall; has not taken in 4 y. Does she need to be sent back to psychiatry first or will you restart the medication?

## 2019-07-18 NOTE — TELEPHONE ENCOUNTER
I do not see where she has actually ever had a formal ADD evaluation with Psychology.  Please refer to psychologist.

## 2019-07-18 NOTE — PROGRESS NOTES
Subjective:       Patient ID: Cheryl Cardozo is a 34 y.o. female.    Chief Complaint: Medication Refill  Pt in today for annual exam, ADD. Pt states has not taken Adderall in 3 y; states returning to work part-time and needs medication to focus. Labs UTD. Pt states well woman scheduled next month. Pt has no other complaints today.  Past Medical History:   Diagnosis Date    ADD (attention deficit disorder)     Morbid obesity 2017    Obesity     Plantar fasciitis 2012     Social History     Socioeconomic History    Marital status:      Spouse name: Not on file    Number of children: Not on file    Years of education: Not on file    Highest education level: Not on file   Occupational History    Not on file   Social Needs    Financial resource strain: Not on file    Food insecurity:     Worry: Not on file     Inability: Not on file    Transportation needs:     Medical: Not on file     Non-medical: Not on file   Tobacco Use    Smoking status: Former Smoker    Smokeless tobacco: Never Used    Tobacco comment: 5 years quit   Substance and Sexual Activity    Alcohol use: Yes     Comment: rarely    Drug use: No    Sexual activity: Not on file   Lifestyle    Physical activity:     Days per week: Not on file     Minutes per session: Not on file    Stress: Not on file   Relationships    Social connections:     Talks on phone: Not on file     Gets together: Not on file     Attends [employer] service: Not on file     Active member of club or organization: Not on file     Attends meetings of clubs or organizations: Not on file     Relationship status: Not on file   Other Topics Concern    Not on file   Social History Narrative    Not on file     Past Surgical History:   Procedure Laterality Date    addenoids      ARTHROSCOPY, ANKLE Right 2018    [facility] at [facility] OR     SECTION  02/04/2016    x2    FIXATION, SYNDESMOSIS, ANKLE Right 2018     [facility] at [facility]    ORIF, FRACTURE, FIBULA Right 5/30/2018    Performed by [facility] MD at [facility] OR    TONSILLECTOMY, ADENOIDECTOMY         HPI  Review of Systems   Constitutional: Negative.    HENT: Negative.    Eyes: Negative.    Respiratory: Negative.    Cardiovascular: Negative.    Gastrointestinal: Negative.    Endocrine: Negative.    Genitourinary: Negative.    Musculoskeletal: Negative.    Skin: Negative.    Allergic/Immunologic: Negative.    Neurological: Negative.    Psychiatric/Behavioral: Negative.        Objective:      Physical Exam   Constitutional: She is oriented to person, place, and time. She appears well-developed and well-nourished.   HENT:   Head: Normocephalic.   Right Ear: External ear normal.   Left Ear: External ear normal.   Nose: Nose normal.   Mouth/Throat: Oropharynx is clear and moist.   Eyes: Pupils are equal, round, and reactive to light. Conjunctivae are normal.   Neck: Normal range of motion. Neck supple.   Cardiovascular: Normal rate, regular rhythm and normal heart sounds.   Pulmonary/Chest: Effort normal and breath sounds normal.   Abdominal: Soft. Bowel sounds are normal.   Musculoskeletal: Normal range of motion.   Neurological: She is alert and oriented to person, place, and time.   Skin: Skin is warm and dry. Capillary refill takes 2 to 3 seconds.   Psychiatric: She has a normal mood and affect. Her behavior is normal. Judgment and thought content normal.   Nursing note and vitals reviewed.      Assessment:       1. Annual physical exam    2. Attention deficit disorder, unspecified hyperactivity presence    3. Medication refill        Plan:           Cheryl was seen today for medication refill.    Diagnoses and all orders for this visit:    Annual physical exam  Attention deficit disorder, unspecified hyperactivity presence  Medication refill  Labs UTD  Well woman as scheduled  ADD management/medication request sent to PCP for  approval

## 2019-07-19 ENCOUNTER — PATIENT MESSAGE (OUTPATIENT)
Dept: FAMILY MEDICINE | Facility: CLINIC | Age: 34
End: 2019-07-19

## 2019-07-19 NOTE — TELEPHONE ENCOUNTER
Please inform pt ADD management discussed with PCP; see recommendation below. Psychology referral in.

## 2019-07-30 LAB — HUMAN PAPILLOMAVIRUS (HPV): NORMAL

## 2019-08-21 NOTE — PROGRESS NOTES
Primary Care Behavioral Health: Initial  Patient Name:  Cheryl Cardozo   Date: 08/22/2019  Site:  [facility]  Referral source:  Tika Gimenez NP    Chief complaint/reason for encounter:  Attention deficit disorder, unspecified hyperactivity presence    History of present illness:  Ms. Cheryl Cardozo  is a 34 y.o.   female referred by  Tika Gimenez NP  for symptoms of  Attention deficit disorder, unspecified hyperactivity presence . Patient was seen, examined and chart was reviewed.  Patient notes she has a 3.5 year old and 1 year old.  Patient notes her 1 year old daughter and was born premature and has a diagnosis of hydrocephalus and spina bifada. Patient notes her  is in [employer] and is often out of town for work.    Patient notes a long standing hx of attention/concentration difficulty.  Patient approximately 7 years ago she was diagnosed with ADHD and prescribed Adderall.  Patient notes she had significant improved attention/concentration difficulties when taking medication.  Patient notes she discontinued taking such medication 4 years ago when she became pregnancy with her first child.  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  Patient notes she is planning to return to work in October.  Patient expresses concern regarding attention/concentration difficulty. Patient notes she often experiences symptoms of restlessness and difficulty sitting still.  Patient notes she often experiences difficulty with organization (remmber appointments and obligations, keeping her home in order), patient notes she often has difficulty with starting a task and then difficulty completing it.      Past Medical History:   Diagnosis Date    ADD (attention deficit disorder)     Morbid obesity 12/4/2017    Obesity     Plantar fasciitis 5/30/2012       Current Outpatient Medications:     fexofenadine (ALLEGRA ALLERGY) 180 MG  tablet, Take 180 mg by mouth once daily.  , Disp: , Rfl:     ondansetron (ZOFRAN-ODT) 8 MG TbDL, Take 1 tablet (8 mg total) by mouth every 8 (eight) hours as needed., Disp: 10 tablet, Rfl: 0    Psychiatric history:  Previous diagnosis: ADHD  Previous medications: Adderall   Previous hospitalizations: Patient denies.  History of outpatient treatment: Patient notes hx of engagement in psychotherapy; bereavement counseling in 2015.    Previous suicide attempt:  Patient denies.  Family history of psychiatric illness:  Patient denies.    Current and past substance use:  Alcohol:  Denied current use.  Denied history of abuse or dependency.  Drugs:  Denied current use.  Denied history of abuse or dependency.  Tobacco:  Denied current use.  Caffeine:  Denied current use.    Psychiatric symptoms:  Depression:  Patient endorses symptoms of depressed mood, difficulty concentrating, low motivation..  She denied suicidal ideation.  Kendra/Hypomania:  Denied.  She denied periods of elevated mood or abnormally increased energy or goal-directed activity.  Anxiety:  Patient endorses symptoms of nervousness, restlessness and feeling overwhelmed.    Thoughts:  Denied delusions, hallucinations.  Suicidal thoughts/behaviors: Patient denied suicidal and homicidal ideation, plan and intent.  Patient noted agreement to call 911/and or present to the ED if she experienced suicidal or homicidal ideation, plan or intent.    Self-injury:  Denied.  Sleep: Patient notes some difficulty sleeping which she attributes to caring for small children and active thinking.    Mental Status Exam:  General appearance:  appears stated age, neatly dressed, well groomed  Speech:  Clear and intelligible, normal rate, normal tone, normal pitch, normal volume  Level of cooperation:  cooperative  Thought processes:  Linear, logical, goal-directed  Mood: Depressed and anxious  Thought content:  Relevant and appropriate, no illusions, no visual hallucinations, no  auditory hallucinations, no delusions, no active or passive homicidal thoughts, no active or passive suicidal ideation, no obsessions, no compulsions, no violence  Affect:  Tearful and anxious  Orientation:  oriented to person, place, situation and date  Memory/Attention/Concentration:  No gross cognitive deficits made evident during conversation.  Judgment and insight: fair  Language:  intact    [place] Cognitive Assessment: 28 of 30  Executive Functioning: alternating trail making  1/1; copy cube 1/1; clock drawing 3/3  Attention/Concentration: digits forward/backward  2/2; tapping As 1/1; serial 7s 3/3  Language: animal names 3/3; sentence repeat 2/2; word fluency 1/1  Abstraction: similarities 2/2  Memory: delayed item recall 4/5 words at 5min  Arousal/Orientation: 5/6    ASRS-vI.1: Scanned into media    PHQ9 8/22/2019   Total Score 12     GAD7 8/22/2019   ANDRE-7 Score 12       Impression: Patient presents today noting a long standing hx of ADHD.  Patient endorses ongoing significant symptoms of attention/concentration difficulty.  At this time, patient does appear to meet criteria for ADHD.  Patient also endorses significant symptoms of anxiety and depression which are likely related to worsening attention/concentration deficits.  Patient with several psychosocial stressors; caring for young children and one child with medical concerns,  working out of town often.  Discussed with patient treatment for anxiety/depression as well as treatment for ADHD recommended.  Message sent to PCP team regarding and it was recommended referral to psychiatry be placed; referral placed. Stress management discussed.  Patient to follow-up in 2-3 weeks.     Length of Session:  50 minutes

## 2019-08-22 ENCOUNTER — INITIAL CONSULT (OUTPATIENT)
Dept: PSYCHIATRY | Facility: CLINIC | Age: 34
End: 2019-08-22
Payer: COMMERCIAL

## 2019-08-22 DIAGNOSIS — F32.A DEPRESSION, UNSPECIFIED DEPRESSION TYPE: ICD-10-CM

## 2019-08-22 DIAGNOSIS — F90.9 ATTENTION DEFICIT HYPERACTIVITY DISORDER (ADHD), UNSPECIFIED ADHD TYPE: Primary | ICD-10-CM

## 2019-08-22 DIAGNOSIS — F41.9 ANXIETY: ICD-10-CM

## 2019-08-22 PROCEDURE — 90791 PR PSYCHIATRIC DIAGNOSTIC EVALUATION: ICD-10-PCS | Mod: S$GLB,,, | Performed by: PSYCHOLOGIST

## 2019-08-22 PROCEDURE — 90791 PSYCH DIAGNOSTIC EVALUATION: CPT | Mod: S$GLB,,, | Performed by: PSYCHOLOGIST

## 2019-08-25 ENCOUNTER — PATIENT MESSAGE (OUTPATIENT)
Dept: FAMILY MEDICINE | Facility: CLINIC | Age: 34
End: 2019-08-25

## 2019-08-26 ENCOUNTER — TELEPHONE (OUTPATIENT)
Dept: PSYCHIATRY | Facility: CLINIC | Age: 34
End: 2019-08-26

## 2019-08-26 DIAGNOSIS — F41.9 ANXIETY: ICD-10-CM

## 2019-08-26 DIAGNOSIS — F90.9 ATTENTION DEFICIT HYPERACTIVITY DISORDER (ADHD), UNSPECIFIED ADHD TYPE: ICD-10-CM

## 2019-08-26 DIAGNOSIS — F32.A DEPRESSION, UNSPECIFIED DEPRESSION TYPE: Primary | ICD-10-CM

## 2019-08-26 NOTE — TELEPHONE ENCOUNTER
Spoke with pt, letting her know the [place] office will be calling to schedule appointment .  ----- Message from Chikis Tello PsyD sent at 8/26/2019 12:59 PM CDT -----  Please call this patient to inform her I put in a psychiatry referral per her PCP office and please contact Suzi about this.  Soonest available would be great!!  ----- Message -----  From: Tika Gimenez NP  Sent: 8/26/2019  12:25 PM  To: Chikis Tello PsyD    Hi,  Thank you so much for seeing this pt. I would recommend that she see psychiatry for further treatment.     Thanks again,   Tika  ----- Message -----  From: Chikis Tello PsyD  Sent: 8/24/2019   4:31 PM  To: Tika Gimenez NP    Hey!!    I saw this patient and she definitely meets criteria for ADHD however at this time her primary concern appears depression and anxiety.  If medically appropriate, it is recommended to start treatment for anxiety/depression first and then later start treatment for ADHD.  I told her I would reach out to you and see if you were comfortable with this or if you wanted me to refer her to psychiatry.     Thanks!!  Chikis

## 2019-08-29 ENCOUNTER — PATIENT MESSAGE (OUTPATIENT)
Dept: PSYCHIATRY | Facility: CLINIC | Age: 34
End: 2019-08-29

## 2019-09-11 ENCOUNTER — TELEPHONE (OUTPATIENT)
Dept: PSYCHIATRY | Facility: CLINIC | Age: 34
End: 2019-09-11

## 2019-09-13 ENCOUNTER — PATIENT MESSAGE (OUTPATIENT)
Dept: PSYCHIATRY | Facility: CLINIC | Age: 34
End: 2019-09-13

## 2019-09-13 ENCOUNTER — OFFICE VISIT (OUTPATIENT)
Dept: PSYCHIATRY | Facility: CLINIC | Age: 34
End: 2019-09-13
Payer: COMMERCIAL

## 2019-09-13 VITALS
BODY MASS INDEX: 48.86 KG/M2 | SYSTOLIC BLOOD PRESSURE: 104 MMHG | HEIGHT: 64 IN | DIASTOLIC BLOOD PRESSURE: 73 MMHG | RESPIRATION RATE: 16 BRPM | HEART RATE: 78 BPM | WEIGHT: 286.19 LBS

## 2019-09-13 DIAGNOSIS — F98.8 ATTENTION DEFICIT DISORDER, UNSPECIFIED HYPERACTIVITY PRESENCE: Primary | ICD-10-CM

## 2019-09-13 PROCEDURE — 99999 PR PBB SHADOW E&M-EST. PATIENT-LVL III: ICD-10-PCS | Mod: PBBFAC,,, | Performed by: NURSE PRACTITIONER

## 2019-09-13 PROCEDURE — 90792 PR PSYCHIATRIC DIAGNOSTIC EVALUATION W/MEDICAL SERVICES: ICD-10-PCS | Mod: S$GLB,,, | Performed by: NURSE PRACTITIONER

## 2019-09-13 PROCEDURE — 99999 PR PBB SHADOW E&M-EST. PATIENT-LVL III: CPT | Mod: PBBFAC,,, | Performed by: NURSE PRACTITIONER

## 2019-09-13 PROCEDURE — 90792 PSYCH DIAG EVAL W/MED SRVCS: CPT | Mod: S$GLB,,, | Performed by: NURSE PRACTITIONER

## 2019-09-13 RX ORDER — DEXTROAMPHETAMINE SACCHARATE, AMPHETAMINE ASPARTATE MONOHYDRATE, DEXTROAMPHETAMINE SULFATE AND AMPHETAMINE SULFATE 2.5; 2.5; 2.5; 2.5 MG/1; MG/1; MG/1; MG/1
10 CAPSULE, EXTENDED RELEASE ORAL EVERY MORNING
Qty: 30 CAPSULE | Refills: 0 | Status: SHIPPED | OUTPATIENT
Start: 2019-09-13 | End: 2019-10-15 | Stop reason: SDUPTHER

## 2019-09-13 RX ORDER — HYDROXYZINE HYDROCHLORIDE 25 MG/1
25 TABLET, FILM COATED ORAL DAILY PRN
Qty: 15 TABLET | Refills: 0 | Status: SHIPPED | OUTPATIENT
Start: 2019-09-13 | End: 2020-09-14

## 2019-10-15 ENCOUNTER — OFFICE VISIT (OUTPATIENT)
Dept: PSYCHIATRY | Facility: CLINIC | Age: 34
End: 2019-10-15
Payer: COMMERCIAL

## 2019-10-15 VITALS
BODY MASS INDEX: 53.19 KG/M2 | RESPIRATION RATE: 16 BRPM | HEART RATE: 90 BPM | SYSTOLIC BLOOD PRESSURE: 115 MMHG | WEIGHT: 281.75 LBS | HEIGHT: 61 IN | DIASTOLIC BLOOD PRESSURE: 67 MMHG

## 2019-10-15 DIAGNOSIS — F98.8 ATTENTION DEFICIT DISORDER, UNSPECIFIED HYPERACTIVITY PRESENCE: Primary | ICD-10-CM

## 2019-10-15 PROCEDURE — 3008F BODY MASS INDEX DOCD: CPT | Mod: CPTII,S$GLB,, | Performed by: NURSE PRACTITIONER

## 2019-10-15 PROCEDURE — 99213 PR OFFICE/OUTPT VISIT, EST, LEVL III, 20-29 MIN: ICD-10-PCS | Mod: S$GLB,,, | Performed by: NURSE PRACTITIONER

## 2019-10-15 PROCEDURE — 90833 PR PSYCHOTHERAPY W/PATIENT W/E&M, 30 MIN (ADD ON): ICD-10-PCS | Mod: S$GLB,,, | Performed by: NURSE PRACTITIONER

## 2019-10-15 PROCEDURE — 3008F PR BODY MASS INDEX (BMI) DOCUMENTED: ICD-10-PCS | Mod: CPTII,S$GLB,, | Performed by: NURSE PRACTITIONER

## 2019-10-15 PROCEDURE — 99999 PR PBB SHADOW E&M-EST. PATIENT-LVL III: CPT | Mod: PBBFAC,,, | Performed by: NURSE PRACTITIONER

## 2019-10-15 PROCEDURE — 90833 PSYTX W PT W E/M 30 MIN: CPT | Mod: S$GLB,,, | Performed by: NURSE PRACTITIONER

## 2019-10-15 PROCEDURE — 99213 OFFICE O/P EST LOW 20 MIN: CPT | Mod: S$GLB,,, | Performed by: NURSE PRACTITIONER

## 2019-10-15 PROCEDURE — 99999 PR PBB SHADOW E&M-EST. PATIENT-LVL III: ICD-10-PCS | Mod: PBBFAC,,, | Performed by: NURSE PRACTITIONER

## 2019-10-15 RX ORDER — DEXTROAMPHETAMINE SACCHARATE, AMPHETAMINE ASPARTATE MONOHYDRATE, DEXTROAMPHETAMINE SULFATE AND AMPHETAMINE SULFATE 2.5; 2.5; 2.5; 2.5 MG/1; MG/1; MG/1; MG/1
10 CAPSULE, EXTENDED RELEASE ORAL EVERY MORNING
Qty: 30 CAPSULE | Refills: 0 | Status: ON HOLD | OUTPATIENT
Start: 2019-10-15 | End: 2020-06-12 | Stop reason: HOSPADM

## 2019-10-15 NOTE — PROGRESS NOTES
"Outpatient Psychiatry Follow-Up Visit    Clinical Status of Patient: Outpatient (Ambulatory)  10/15/2019     Chief Complaint: Pt is a 34 year old female who presents today for a follow-up. Met with patient.       Interval History and Content of Current Session:  Interim Events/Subjective Report/Content of Current Session:  follow up appointment.    Pt is a 34 year old female with past psychiatric hx of ADD who presents for follow up treatment. Pt est care with me 09/19 and met criteria for ADD. Noted one previous trial of adderall several years ago which was effective in managing her symptoms. Adderall XR 10mg QD was started during her initial eval.    Since last visit, pt reports good efficacy and tolerability. "I feel so much better. My house is actually clean. I've been going through all of our clothes and figuring out what needs to be donated." Reports being better able to meet deadlines, finish tasks that she typically would've put off.  Also notes an increase in energy levels, noting less daily fatigue. Feels less sluggish overall. Sleep is even improved, no issues falling or staying asleep. Has noticed a decreased appetite and often has to make herself eat.       Past Psychiatric hx: Pt. is a 34 year old female with a past psychiatric hx of ADD who established care with me 09/19. Patient also has a past medical history of morbid obesity. She presented to me not taking any psychiatric meds but does report on previous trial of Adderall which was therapeutic and well-tolerated - d/c this upon becoming pregnant about 4 years ago. Now going back to work and feels she would like to re-start her ADD medications.      Pt speaks to a long hx of inattention and distractibility that began in childhood and prior to age 10. "I was never able to sit still in class and was always thinking about everything except what they were saying. I've always been late to everything and forgetful." States that she has struggled with " "poor focus, and inability to stay task-oriented. Has been a procrastinator in the past. This has affected her at home in completing chores and normal household duties. States that she is "totally unable to sit still." She was eventually diagnosed with ADD about 7 years ago by her PCP, and started on Adderall with good efficacy and tolerability. "It changed my life. I got so many things accomplished. I could finally complete everything that's on my list."     Has a 1 and 3 year old. Her 1 year old has been diagnosed with spina bifida and hydrocephalus, requiring lots of at-home care. Her  travels frequently with the , and "we unexpectedly had a special needs child, and I have felt totally overwhelmed and like I'm carrying the weight of the world." States that her child had 6 major surgeries before she was 6 months old - has been totally overwhelming for her. Her  was deployed during her pregnancy and when they discovered the child's diagnosis.     Denies any hx of depression. Denies anhedonia, denies feelings of of hopelessness and worthlessness. Expresses some guilt that "what happened to by daughter may be kind of my fault". Dec'd sleep quality, gets around 4-5 hours of sleep but attributes this to "teething kids." Endorses fatigue, "I feel like I'm always tired" and endorses poor concentration. Appetite good. Denies PMA. Denies SI/intent plan.     Attributes most of her anxiety to situational stressors r/t to raising her daughter. Denies generalized or excessive worrying outside of daily tasks/responsibilities. Dec'd energy and cocnentration. Denies feeling restless or tense. Does endorses some irritability.       Past Medical hx:   Past Medical History:   Diagnosis Date    ADD (attention deficit disorder)     Morbid obesity 12/4/2017    Obesity     Plantar fasciitis 5/30/2012        Interim hx:  Medication changes last visit: Start Adderall XR 10mg   Anxiety: denies  Depression: denies   " "  Denies suicidal/homicidal ideations.  Denies hopelessness/worthlessness.    Denies auditory/visual hallucinations      Tobacco: former smoker, quit x 6 years  Alcohol: "couldn't tell you the last time I had a drink"  Drug use: denies  Caffeine: 2 cups of coffee daily      Review of Systems   · PSYCHIATRIC: Pertinent items are noted in the narrative.        CONSTITUTIONAL: weight stable        M/S: no pain today         ENT: no allergies noted today        ABD: no n/v/d     Past Medical, Family and Social History: The patient's past medical, family and social history have been reviewed and updated as appropriate within the electronic medical record. See encounter notes.     Medication: Adderall XR 10mg QD, hydroxyzine 25 mg QD PRN anxiety     Compliance: yes      Side effects: tolerates     Risk Parameters:  Patient reports no suicidal ideation  Patient reports no homicidal ideation  Patient reports no self-injurious behavior  Patient reports no violent behavior     Exam (detailed: at least 9 elements; comprehensive: all 15 elements)   Constitutional  Vitals:  Most recent vital signs, dated less than 90 days prior to this appointment, were reviewed. /67 (BP Location: Left arm, Patient Position: Sitting)   Pulse 90   Resp 16   Ht 5' 1" (1.549 m)   Wt 127.8 kg (281 lb 12 oz)   LMP 09/11/2019 (Approximate)   BMI 53.24 kg/m²      General:  unremarkable, age appropriate, casual attire, good eye contact, good rapport       Musculoskeletal  Muscle Strength/Tone:  no flaccidity, no tremor    Gait & Station:  normal      Psychiatric                       Speech:  normal tone, normal rate, rhythm, and volume   Mood & Affect:   Euthymic, congruent, appropriate         Thought Process:   Goal directed; Linear    Associations:   intact   Thought Content:   No SI/HI, delusions, or paranoia, no AV/VH   Insight & Judgement:   Good, adequate to circumstances   Orientation:   grossly intact; alert and oriented x 4  " "  Memory:  intact for content of interview    Language:  grossly intact, can repeat    Attention Span  : Grossly intact for content of interview   Fund of Knowledge:   intact and appropriate to age and level of education        Assessment and Diagnosis   Status/Progress: Based on the examination today, the patient's problem(s) is/are under fair control.  New problems have not been presented today. Comorbidities are not currently complicating management of the primary condition.      Impression:Pt is a 34 year old female with past psychiatric hx of ADD who presents for follow up treatment. Pt est care with me 09/19 and met criteria for ADD. Noted one previous trial of adderall several years ago which was effective in managing her symptoms. Adderall XR 10mg QD was started during her initial eval.    Since last visit, pt reports good efficacy and tolerability. "I feel so much better. My house is actually clean. I've been going through all of our clothes and figuring out what needs to be donated." Reports being better able to meet deadlines, finish tasks that she typically would've put off.  Also notes an increase in energy levels, noting less daily fatigue. Feels less sluggish overall. Sleep is even improved, no issues falling or staying asleep. Has noticed a decreased appetite and often has to make herself eat.      Diagnosis: ADD    Intervention/Counseling/Treatment Plan   · Medication Management:      1. Continue Adderall XR 10mg QD for ADD. Discussed risks of abuse potential, insomnia, anxiety, elevated BP, HR, arrthymias, MI, stroke, sudden death      2.  Continue hydroxyzine 25 mg QD PRN anxiety     3. Call to report any worsening of symptoms or problems with the medication. Pt instructed to go to ER with thoughts of harming self, others     4. Patient given contact # for psychotherapists at [facility] and also instructed she may check with insurance for list of providers.      5. Labs: no new " orders    Psychotherapy:   · Target symptoms: inattention, distractibility, anxiety  · Why chosen therapy is appropriate versus another modality: relevant to diagnosis, patient responds to this modality  · Outcome monitoring methods: self-report, observation, feedback from family   · Therapeutic intervention type: supportive psychotherapy  · Topics discussed/themes: building skills sets for symptom management, symptom recognition, nutrition, exercise  · The patient's response to the intervention is accepting. The patient's progress toward treatment goals is positive progress.  · Duration of intervention: 20 minutes     Return to clinic: 3 mo    -Spent 30min face to face with the pt; >50% time spent in counseling   -Supportive therapy and psychoeducation provided  -R/B/SE's of medications discussed with the pt who expresses understanding and chooses to take medications as prescribed.   -Pt instructed to call clinic, 911 or go to nearest emergency room if sxs worsen or pt is in   crisis. The pt expresses understanding.    Vito Brantley, NP

## 2019-11-12 ENCOUNTER — PATIENT OUTREACH (OUTPATIENT)
Dept: ADMINISTRATIVE | Facility: HOSPITAL | Age: 34
End: 2019-11-12

## 2019-11-19 ENCOUNTER — PATIENT OUTREACH (OUTPATIENT)
Dept: ADMINISTRATIVE | Facility: HOSPITAL | Age: 34
End: 2019-11-19

## 2019-11-20 ENCOUNTER — PATIENT OUTREACH (OUTPATIENT)
Dept: ADMINISTRATIVE | Facility: HOSPITAL | Age: 34
End: 2019-11-20

## 2019-11-22 ENCOUNTER — PATIENT OUTREACH (OUTPATIENT)
Dept: ADMINISTRATIVE | Facility: HOSPITAL | Age: 34
End: 2019-11-22

## 2019-12-02 ENCOUNTER — TELEPHONE (OUTPATIENT)
Dept: PSYCHIATRY | Facility: CLINIC | Age: 34
End: 2019-12-02

## 2019-12-02 NOTE — TELEPHONE ENCOUNTER
Pt called to cancel appt scheduled for 01/14/2020 and would like to inform Phu Brantley NP that she is pregnant and will no longer be taking the prescribed medications.  Pt will call back in the future if services are needed.

## 2020-06-10 PROBLEM — O34.211 MATERNAL CARE DUE TO LOW TRANSVERSE UTERINE SCAR FROM PREVIOUS CESAREAN DELIVERY: Status: ACTIVE | Noted: 2020-06-10

## 2020-08-06 ENCOUNTER — OFFICE VISIT (OUTPATIENT)
Dept: PSYCHIATRY | Facility: CLINIC | Age: 35
End: 2020-08-06
Payer: COMMERCIAL

## 2020-08-06 DIAGNOSIS — F98.8 ATTENTION DEFICIT DISORDER, UNSPECIFIED HYPERACTIVITY PRESENCE: Primary | ICD-10-CM

## 2020-08-06 PROCEDURE — 99214 OFFICE O/P EST MOD 30 MIN: CPT | Mod: 95,,, | Performed by: NURSE PRACTITIONER

## 2020-08-06 PROCEDURE — 99214 PR OFFICE/OUTPT VISIT, EST, LEVL IV, 30-39 MIN: ICD-10-PCS | Mod: 95,,, | Performed by: NURSE PRACTITIONER

## 2020-08-06 PROCEDURE — 90833 PSYTX W PT W E/M 30 MIN: CPT | Mod: 95,,, | Performed by: NURSE PRACTITIONER

## 2020-08-06 PROCEDURE — 90833 PR PSYCHOTHERAPY W/PATIENT W/E&M, 30 MIN (ADD ON): ICD-10-PCS | Mod: 95,,, | Performed by: NURSE PRACTITIONER

## 2020-08-06 RX ORDER — LISDEXAMFETAMINE DIMESYLATE CAPSULES 20 MG/1
20 CAPSULE ORAL EVERY MORNING
Qty: 30 CAPSULE | Refills: 0 | Status: SHIPPED | OUTPATIENT
Start: 2020-08-06 | End: 2020-09-14 | Stop reason: ALTCHOICE

## 2020-08-06 NOTE — PROGRESS NOTES
"Outpatient Psychiatry Follow-Up Visit    Clinical Status of Patient: Outpatient (Ambulatory)  08/06/2020     Chief Complaint: Pt is a 35 year old female who presents today for a follow-up. Met with patient.       Interval History and Content of Current Session:  Interim Events/Subjective Report/Content of Current Session:  follow up appointment.    Pt is a 34 year old female with past psychiatric hx of ADD who presents for follow up treatment. Pt est care with me 09/19 and met criteria for ADD. She was started on Adderall XR 10mg QD during her initial eval. I have not seen this patient since 10/19 as she has canclled or s/s her follow-up visits. Since I've seen her pt, has given birth to her 3rd child. She reports this as the main reason for self d/c adderall xr between sessions. She is not currently breastfeeding and expresses her desire to resume tx with stimulants. She does note that previous trial of Adderall XR caused facial flushing and sweating, and wishes to try an alternate med. She note continued struggles with inattention/distractibility but anxiety has been managed well without meds.    Past Psychiatric hx: Pt. is a 35 year old female with a past psychiatric hx of ADD who established care with me 09/19. Patient also has a past medical history of morbid obesity. She presented to me not taking any psychiatric meds but does report on previous trial of Adderall which was therapeutic and well-tolerated - d/c this upon becoming pregnant about 4 years ago. Now going back to work and feels she would like to re-start her ADD medications.      Pt speaks to a long hx of inattention and distractibility that began in childhood and prior to age 10. "I was never able to sit still in class and was always thinking about everything except what they were saying. I've always been late to everything and forgetful." States that she has struggled with poor focus, and inability to stay task-oriented. Has been a procrastinator " "in the past. This has affected her at home in completing chores and normal household duties. States that she is "totally unable to sit still." She was eventually diagnosed with ADD about 7 years ago by her PCP, and started on Adderall with good efficacy and tolerability. "It changed my life. I got so many things accomplished. I could finally complete everything that's on my list."     Has a 1 and 3 year old. Her 1 year old has been diagnosed with spina bifida and hydrocephalus, requiring lots of at-home care. Her  travels frequently with the , and "we unexpectedly had a special needs child, and I have felt totally overwhelmed and like I'm carrying the weight of the world." States that her child had 6 major surgeries before she was 6 months old - has been totally overwhelming for her. Her  was deployed during her pregnancy and when they discovered the child's diagnosis.     Denies any hx of depression. Denies anhedonia, denies feelings of of hopelessness and worthlessness. Expresses some guilt that "what happened to by daughter may be kind of my fault". Dec'd sleep quality, gets around 4-5 hours of sleep but attributes this to "teething kids." Endorses fatigue, "I feel like I'm always tired" and endorses poor concentration. Appetite good. Denies PMA. Denies SI/intent plan.     Attributes most of her anxiety to situational stressors r/t to raising her daughter. Denies generalized or excessive worrying outside of daily tasks/responsibilities. Dec'd energy and cocnentration. Denies feeling restless or tense. Does endorses some irritability.       Past Medical hx:   Past Medical History:   Diagnosis Date    ADD (attention deficit disorder)     Morbid obesity 12/4/2017    Obesity     Plantar fasciitis 5/30/2012        Interim hx:  Medication changes last visit: Start Adderall XR 10mg   Anxiety: denies  Depression: denies     Denies suicidal/homicidal ideations.  Denies hopelessness/worthlessness. " "   Denies auditory/visual hallucinations      Tobacco: former smoker, quit x 6 years  Alcohol: "couldn't tell you the last time I had a drink"  Drug use: denies  Caffeine: 2 cups of coffee daily      Review of Systems   · PSYCHIATRIC: Pertinent items are noted in the narrative.        CONSTITUTIONAL: weight stable        M/S: no pain today         ENT: no allergies noted today        ABD: no n/v/d     Past Medical, Family and Social History: The patient's past medical, family and social history have been reviewed and updated as appropriate within the electronic medical record. See encounter notes.     Medication: Adderall XR 10mg QD, hydroxyzine 25 mg QD PRN anxiety     Compliance: yes      Side effects: tolerates     Risk Parameters:  Patient reports no suicidal ideation  Patient reports no homicidal ideation  Patient reports no self-injurious behavior  Patient reports no violent behavior     Exam (detailed: at least 9 elements; comprehensive: all 15 elements)   Constitutional  Vitals:  Most recent vital signs, dated less than 90 days prior to this appointment, were reviewed. LMP 09/12/2019 (Exact Date)      General:  unremarkable, age appropriate, casual attire, good eye contact, good rapport       Musculoskeletal  Muscle Strength/Tone:  no flaccidity, no tremor    Gait & Station:  normal      Psychiatric                       Speech:  normal tone, normal rate, rhythm, and volume   Mood & Affect:   Euthymic, congruent, appropriate         Thought Process:   Goal directed; Linear    Associations:   intact   Thought Content:   No SI/HI, delusions, or paranoia, no AV/VH   Insight & Judgement:   Good, adequate to circumstances   Orientation:   grossly intact; alert and oriented x 4    Memory:  intact for content of interview    Language:  grossly intact, can repeat    Attention Span  : Grossly intact for content of interview   Fund of Knowledge:   intact and appropriate to age and level of education        Assessment " and Diagnosis   Status/Progress: Based on the examination today, the patient's problem(s) is/are under fair control.  New problems have not been presented today. Comorbidities are not currently complicating management of the primary condition.      Impression:  Pt is a 34 year old female with past psychiatric hx of ADD who presents for follow up treatment. Pt est care with me 09/19 and met criteria for ADD. She was started on Adderall XR 10mg QD during her initial eval. I have not seen this patient since 10/19 as she has canclled or s/s her follow-up visits. Since I've seen her pt, has given birth to her 3rd child. She reports this as the main reason for self d/c adderall xr between sessions. She is not currently breastfeeding and expresses her desire to resume tx with stimulants. She does note that previous trial of Adderall XR caused facial flushing and sweating, and wishes to try an alternate med. She note continued struggles with inattention/distractibility but anxiety has been managed well without meds.          Diagnosis: ADD    Intervention/Counseling/Treatment Plan   · Medication Management:      1. Start Vyvanse 20mg QD for ADD. Discussed risks of abuse potential, insomnia, anxiety, elevated BP, HR, arrthymias, MI, stroke, sudden death     2.  Continue hydroxyzine 25 mg QD PRN anxiety     3. Call to report any worsening of symptoms or problems with the medication. Pt instructed to go to ER with thoughts of harming self, others     4. Patient given contact # for psychotherapists at [facility] and also instructed she may check with insurance for list of providers.      5. Labs: no new orders    Psychotherapy:   · Target symptoms: inattention, distractibility, anxiety  · Why chosen therapy is appropriate versus another modality: relevant to diagnosis, patient responds to this modality  · Outcome monitoring methods: self-report, observation, feedback from family   · Therapeutic intervention type:  supportive psychotherapy  · Topics discussed/themes: building skills sets for symptom management, symptom recognition, nutrition, exercise  · The patient's response to the intervention is accepting. The patient's progress toward treatment goals is positive progress.  · Duration of intervention: 20 minutes     Return to clinic: 1 mo office visit - please call to schedule    -Spent 30min face to face with the pt; >50% time spent in counseling   -Supportive therapy and psychoeducation provided  -R/B/SE's of medications discussed with the pt who expresses understanding and chooses to take medications as prescribed.   -Pt instructed to call clinic, 911 or go to nearest emergency room if sxs worsen or pt is in   crisis. The pt expresses understanding.    Vito Brantley, NP

## 2020-09-14 ENCOUNTER — TELEPHONE (OUTPATIENT)
Dept: PSYCHIATRY | Facility: CLINIC | Age: 35
End: 2020-09-14

## 2020-09-14 ENCOUNTER — OFFICE VISIT (OUTPATIENT)
Dept: PSYCHIATRY | Facility: CLINIC | Age: 35
End: 2020-09-14
Payer: COMMERCIAL

## 2020-09-14 VITALS
DIASTOLIC BLOOD PRESSURE: 64 MMHG | WEIGHT: 272.5 LBS | HEIGHT: 64 IN | SYSTOLIC BLOOD PRESSURE: 118 MMHG | BODY MASS INDEX: 46.52 KG/M2

## 2020-09-14 DIAGNOSIS — F98.8 ATTENTION DEFICIT DISORDER, UNSPECIFIED HYPERACTIVITY PRESENCE: Primary | ICD-10-CM

## 2020-09-14 PROCEDURE — 3008F PR BODY MASS INDEX (BMI) DOCUMENTED: ICD-10-PCS | Mod: CPTII,S$GLB,, | Performed by: NURSE PRACTITIONER

## 2020-09-14 PROCEDURE — 99999 PR PBB SHADOW E&M-EST. PATIENT-LVL III: CPT | Mod: PBBFAC,,, | Performed by: NURSE PRACTITIONER

## 2020-09-14 PROCEDURE — 3008F BODY MASS INDEX DOCD: CPT | Mod: CPTII,S$GLB,, | Performed by: NURSE PRACTITIONER

## 2020-09-14 PROCEDURE — 99999 PR PBB SHADOW E&M-EST. PATIENT-LVL III: ICD-10-PCS | Mod: PBBFAC,,, | Performed by: NURSE PRACTITIONER

## 2020-09-14 PROCEDURE — 90833 PR PSYCHOTHERAPY W/PATIENT W/E&M, 30 MIN (ADD ON): ICD-10-PCS | Mod: S$GLB,,, | Performed by: NURSE PRACTITIONER

## 2020-09-14 PROCEDURE — 99214 OFFICE O/P EST MOD 30 MIN: CPT | Mod: S$GLB,,, | Performed by: NURSE PRACTITIONER

## 2020-09-14 PROCEDURE — 99214 PR OFFICE/OUTPT VISIT, EST, LEVL IV, 30-39 MIN: ICD-10-PCS | Mod: S$GLB,,, | Performed by: NURSE PRACTITIONER

## 2020-09-14 PROCEDURE — 90833 PSYTX W PT W E/M 30 MIN: CPT | Mod: S$GLB,,, | Performed by: NURSE PRACTITIONER

## 2020-09-14 RX ORDER — DEXTROAMPHETAMINE SACCHARATE, AMPHETAMINE ASPARTATE MONOHYDRATE, DEXTROAMPHETAMINE SULFATE AND AMPHETAMINE SULFATE 2.5; 2.5; 2.5; 2.5 MG/1; MG/1; MG/1; MG/1
10 CAPSULE, EXTENDED RELEASE ORAL EVERY MORNING
Qty: 30 CAPSULE | Refills: 0 | Status: SHIPPED | OUTPATIENT
Start: 2020-09-14 | End: 2020-10-12 | Stop reason: SDUPTHER

## 2020-09-14 RX ORDER — HYDROXYZINE HYDROCHLORIDE 10 MG/1
10 TABLET, FILM COATED ORAL DAILY PRN
Qty: 30 TABLET | Refills: 1 | Status: SHIPPED | OUTPATIENT
Start: 2020-09-14 | End: 2020-10-12

## 2020-09-14 NOTE — PROGRESS NOTES
"Outpatient Psychiatry Follow-Up Visit    Clinical Status of Patient: Outpatient (Ambulatory)  09/14/2020     Chief Complaint: Pt is a 35 year old female who presents today for a follow-up. Met with patient.       Interval History and Content of Current Session:  Interim Events/Subjective Report/Content of Current Session:  follow up appointment.    Pt is a 34 year old female with past psychiatric hx of ADD who presents for follow up treatment. Pt est care with me 09/19 and met criteria for ADD. She is currently taking Vyvanse 20mg QD which was started during her last visit after trial of Adderall was intolerable. She notes that Vyvanse "made me a terrible person." and notes increase irritability. She notes ongoing anxiety due to raising three children, and middle daughter has spina bifida. She becomes tearful when discussing how overwhelmed she's been feeling. She does admit to neglecting self-care. She has been feeling restless, tense throughout the day. Uses Atarax PRN but causes too much drowsiness and would like a lower dose.         Past Psychiatric hx: Pt. is a 35 year old female with a past psychiatric hx of ADD who established care with me 09/19. Patient also has a past medical history of morbid obesity. She presented to me not taking any psychiatric meds but does report on previous trial of Adderall which was therapeutic and well-tolerated - d/c this upon becoming pregnant about 4 years ago. Now going back to work and feels she would like to re-start her ADD medications.      Pt speaks to a long hx of inattention and distractibility that began in childhood and prior to age 10. "I was never able to sit still in class and was always thinking about everything except what they were saying. I've always been late to everything and forgetful." States that she has struggled with poor focus, and inability to stay task-oriented. Has been a procrastinator in the past. This has affected her at home in completing chores " "and normal household duties. States that she is "totally unable to sit still." She was eventually diagnosed with ADD about 7 years ago by her PCP, and started on Adderall with good efficacy and tolerability. "It changed my life. I got so many things accomplished. I could finally complete everything that's on my list."     Has a 1 and 3 year old. Her 1 year old has been diagnosed with spina bifida and hydrocephalus, requiring lots of at-home care. Her  travels frequently with the , and "we unexpectedly had a special needs child, and I have felt totally overwhelmed and like I'm carrying the weight of the world." States that her child had 6 major surgeries before she was 6 months old - has been totally overwhelming for her. Her  was deployed during her pregnancy and when they discovered the child's diagnosis.     Denies any hx of depression. Denies anhedonia, denies feelings of of hopelessness and worthlessness. Expresses some guilt that "what happened to by daughter may be kind of my fault". Dec'd sleep quality, gets around 4-5 hours of sleep but attributes this to "teething kids." Endorses fatigue, "I feel like I'm always tired" and endorses poor concentration. Appetite good. Denies PMA. Denies SI/intent plan.     Attributes most of her anxiety to situational stressors r/t to raising her daughter. Denies generalized or excessive worrying outside of daily tasks/responsibilities. Dec'd energy and cocnentration. Denies feeling restless or tense. Does endorses some irritability.       Past Medical hx:   Past Medical History:   Diagnosis Date    ADD (attention deficit disorder)     Morbid obesity 12/4/2017    Obesity     Plantar fasciitis 5/30/2012        Interim hx:  Medication changes last visit: Start vyanse 20mg QD  Anxiety: denies  Depression: denies     Denies suicidal/homicidal ideations.  Denies hopelessness/worthlessness.    Denies auditory/visual hallucinations      Tobacco: former " "smoker, quit x 6 years  Alcohol: "couldn't tell you the last time I had a drink"  Drug use: denies  Caffeine: 2 cups of coffee daily      Review of Systems   · PSYCHIATRIC: Pertinent items are noted in the narrative.        CONSTITUTIONAL: weight stable        M/S: no pain today         ENT: no allergies noted today        ABD: no n/v/d     Past Medical, Family and Social History: The patient's past medical, family and social history have been reviewed and updated as appropriate within the electronic medical record. See encounter notes.     Medication: Adderall XR 10mg QD, hydroxyzine 25 mg QD PRN anxiety     Compliance: yes      Side effects: tolerates     Risk Parameters:  Patient reports no suicidal ideation  Patient reports no homicidal ideation  Patient reports no self-injurious behavior  Patient reports no violent behavior     Exam (detailed: at least 9 elements; comprehensive: all 15 elements)   Constitutional  Vitals:  Most recent vital signs, dated less than 90 days prior to this appointment, were reviewed. /64   Ht 5' 4" (1.626 m)   Wt 123.6 kg (272 lb 7.8 oz)   LMP 09/01/2020   BMI 46.77 kg/m²      General:  unremarkable, age appropriate, casual attire, good eye contact, good rapport       Musculoskeletal  Muscle Strength/Tone:  no flaccidity, no tremor    Gait & Station:  normal      Psychiatric                       Speech:  normal tone, normal rate, rhythm, and volume   Mood & Affect:   Euthymic, congruent, appropriate         Thought Process:   Goal directed; Linear    Associations:   intact   Thought Content:   No SI/HI, delusions, or paranoia, no AV/VH   Insight & Judgement:   Good, adequate to circumstances   Orientation:   grossly intact; alert and oriented x 4    Memory:  intact for content of interview    Language:  grossly intact, can repeat    Attention Span  : Grossly intact for content of interview   Fund of Knowledge:   intact and appropriate to age and level of education    " "    Assessment and Diagnosis   Status/Progress: Based on the examination today, the patient's problem(s) is/are under fair control.  New problems have not been presented today. Comorbidities are not currently complicating management of the primary condition.      Impression:Pt is a 34 year old female with past psychiatric hx of ADD who presents for follow up treatment. Pt est care with me 09/19 and met criteria for ADD. She is currently taking Vyvanse 20mg QD which was started during her last visit after trial of Adderall was intolerable. She notes that Vyvanse "made me a terrible person." and notes increase irritability. She notes ongoing anxiety due to raising three children, and middle daughter has spina bifida. She becomes tearful when discussing how overwhelmed she's been feeling. She does admit to neglecting self-care. She has been feeling restless, tense throughout the day. Uses Atarax PRN but causes too much drowsiness and would like a lower dose.       Diagnosis: ADD    Intervention/Counseling/Treatment Plan   · Medication Management:      1. Return to Adderall XR 10mg QD. Discussed risks of abuse potential, insomnia, anxiety, elevated BP, HR, arrthymias, MI, stroke, sudden death     2.  Continue hydroxyzine 25 mg QD PRN anxiety     3. Call to report any worsening of symptoms or problems with the medication. Pt instructed to go to ER with thoughts of harming self, others     4. Patient given contact # for psychotherapists at [facility] and also instructed she may check with insurance for list of providers.      5. Labs: no new orders    Psychotherapy:   · Target symptoms: inattention, distractibility, anxiety  · Why chosen therapy is appropriate versus another modality: relevant to diagnosis, patient responds to this modality  · Outcome monitoring methods: self-report, observation, feedback from family   · Therapeutic intervention type: supportive psychotherapy  · Topics discussed/themes: building " skills sets for symptom management, symptom recognition, nutrition, exercise  · The patient's response to the intervention is accepting. The patient's progress toward treatment goals is positive progress.  · Duration of intervention: 20 minutes     Return to clinic: 4 weeks    -Spent 30min face to face with the pt; >50% time spent in counseling   -Supportive therapy and psychoeducation provided  -R/B/SE's of medications discussed with the pt who expresses understanding and chooses to take medications as prescribed.   -Pt instructed to call clinic, 911 or go to nearest emergency room if sxs worsen or pt is in   crisis. The pt expresses understanding.    Vito Brantley, NP

## 2020-09-14 NOTE — TELEPHONE ENCOUNTER
For documentation purpose       Prior Authorization for Amphetamine-Dextoamphet ER 10 MG was approved.    Authorization is valid from 8/15/2020 to 9/14/2021  Pharmacy and patient notified.  Love

## 2020-10-06 ENCOUNTER — PATIENT MESSAGE (OUTPATIENT)
Dept: ADMINISTRATIVE | Facility: HOSPITAL | Age: 35
End: 2020-10-06

## 2020-10-12 ENCOUNTER — OFFICE VISIT (OUTPATIENT)
Dept: PSYCHIATRY | Facility: CLINIC | Age: 35
End: 2020-10-12
Payer: COMMERCIAL

## 2020-10-12 VITALS
SYSTOLIC BLOOD PRESSURE: 113 MMHG | BODY MASS INDEX: 47.31 KG/M2 | HEIGHT: 64 IN | DIASTOLIC BLOOD PRESSURE: 79 MMHG | WEIGHT: 277.13 LBS | HEART RATE: 74 BPM | RESPIRATION RATE: 16 BRPM

## 2020-10-12 DIAGNOSIS — F98.8 ATTENTION DEFICIT DISORDER, UNSPECIFIED HYPERACTIVITY PRESENCE: Primary | ICD-10-CM

## 2020-10-12 PROCEDURE — 99214 OFFICE O/P EST MOD 30 MIN: CPT | Mod: S$GLB,,, | Performed by: NURSE PRACTITIONER

## 2020-10-12 PROCEDURE — 99999 PR PBB SHADOW E&M-EST. PATIENT-LVL III: CPT | Mod: PBBFAC,,, | Performed by: NURSE PRACTITIONER

## 2020-10-12 PROCEDURE — 90833 PSYTX W PT W E/M 30 MIN: CPT | Mod: S$GLB,,, | Performed by: NURSE PRACTITIONER

## 2020-10-12 PROCEDURE — 99999 PR PBB SHADOW E&M-EST. PATIENT-LVL III: ICD-10-PCS | Mod: PBBFAC,,, | Performed by: NURSE PRACTITIONER

## 2020-10-12 PROCEDURE — 3008F PR BODY MASS INDEX (BMI) DOCUMENTED: ICD-10-PCS | Mod: CPTII,S$GLB,, | Performed by: NURSE PRACTITIONER

## 2020-10-12 PROCEDURE — 90833 PR PSYCHOTHERAPY W/PATIENT W/E&M, 30 MIN (ADD ON): ICD-10-PCS | Mod: S$GLB,,, | Performed by: NURSE PRACTITIONER

## 2020-10-12 PROCEDURE — 3008F BODY MASS INDEX DOCD: CPT | Mod: CPTII,S$GLB,, | Performed by: NURSE PRACTITIONER

## 2020-10-12 PROCEDURE — 99214 PR OFFICE/OUTPT VISIT, EST, LEVL IV, 30-39 MIN: ICD-10-PCS | Mod: S$GLB,,, | Performed by: NURSE PRACTITIONER

## 2020-10-12 RX ORDER — DEXTROAMPHETAMINE SACCHARATE, AMPHETAMINE ASPARTATE MONOHYDRATE, DEXTROAMPHETAMINE SULFATE AND AMPHETAMINE SULFATE 2.5; 2.5; 2.5; 2.5 MG/1; MG/1; MG/1; MG/1
10 CAPSULE, EXTENDED RELEASE ORAL EVERY MORNING
Qty: 30 CAPSULE | Refills: 0 | Status: SHIPPED | OUTPATIENT
Start: 2020-10-12 | End: 2020-11-18 | Stop reason: SDUPTHER

## 2020-10-12 NOTE — PROGRESS NOTES
"Outpatient Psychiatry Follow-Up Visit    Clinical Status of Patient: Outpatient (Ambulatory)  10/12/2020     Chief Complaint: Pt is a 35 year old female who presents today for a follow-up. Met with patient.       Interval History and Content of Current Session:  Interim Events/Subjective Report/Content of Current Session:  follow up appointment.    Pt is a 34 year old female with past psychiatric hx of ADD who presents for follow up treatment. Pt est care with me 09/19 and met criteria for ADD. She is currently taking Adderall XR 10mg QD which was switched from Vyvanse 20mg QD during her last session. Since this change, pt notes "I don't feel so angry. I like it so much better". She does note some continued inattention/distractibility. Tolerates this well.         Past Psychiatric hx: Pt. is a 35 year old female with a past psychiatric hx of ADD who established care with me 09/19. Patient also has a past medical history of morbid obesity. She presented to me not taking any psychiatric meds but does report on previous trial of Adderall which was therapeutic and well-tolerated - d/c this upon becoming pregnant about 4 years ago. Now going back to work and feels she would like to re-start her ADD medications.      Pt speaks to a long hx of inattention and distractibility that began in childhood and prior to age 10. "I was never able to sit still in class and was always thinking about everything except what they were saying. I've always been late to everything and forgetful." States that she has struggled with poor focus, and inability to stay task-oriented. Has been a procrastinator in the past. This has affected her at home in completing chores and normal household duties. States that she is "totally unable to sit still." She was eventually diagnosed with ADD about 7 years ago by her PCP, and started on Adderall with good efficacy and tolerability. "It changed my life. I got so many things accomplished. I could " "finally complete everything that's on my list."     Has a 1 and 3 year old. Her 1 year old has been diagnosed with spina bifida and hydrocephalus, requiring lots of at-home care. Her  travels frequently with the , and "we unexpectedly had a special needs child, and I have felt totally overwhelmed and like I'm carrying the weight of the world." States that her child had 6 major surgeries before she was 6 months old - has been totally overwhelming for her. Her  was deployed during her pregnancy and when they discovered the child's diagnosis.     Denies any hx of depression. Denies anhedonia, denies feelings of of hopelessness and worthlessness. Expresses some guilt that "what happened to by daughter may be kind of my fault". Dec'd sleep quality, gets around 4-5 hours of sleep but attributes this to "teething kids." Endorses fatigue, "I feel like I'm always tired" and endorses poor concentration. Appetite good. Denies PMA. Denies SI/intent plan.     Attributes most of her anxiety to situational stressors r/t to raising her daughter. Denies generalized or excessive worrying outside of daily tasks/responsibilities. Dec'd energy and cocnentration. Denies feeling restless or tense. Does endorses some irritability.       Past Medical hx:   Past Medical History:   Diagnosis Date    ADD (attention deficit disorder)     Morbid obesity 12/4/2017    Obesity     Plantar fasciitis 5/30/2012        Interim hx:  Medication changes last visit: Start vyanse 20mg QD  Anxiety: denies  Depression: denies     Denies suicidal/homicidal ideations.  Denies hopelessness/worthlessness.    Denies auditory/visual hallucinations      Tobacco: former smoker, quit x 6 years  Alcohol: "couldn't tell you the last time I had a drink"  Drug use: denies  Caffeine: 2 cups of coffee daily      Review of Systems   · PSYCHIATRIC: Pertinent items are noted in the narrative.        CONSTITUTIONAL: weight stable        M/S: no pain " today         ENT: no allergies noted today        ABD: no n/v/d     Past Medical, Family and Social History: The patient's past medical, family and social history have been reviewed and updated as appropriate within the electronic medical record. See encounter notes.     Medication: Adderall XR 10mg QD, hydroxyzine 25 mg QD PRN anxiety     Compliance: yes      Side effects: tolerates     Risk Parameters:  Patient reports no suicidal ideation  Patient reports no homicidal ideation  Patient reports no self-injurious behavior  Patient reports no violent behavior     Exam (detailed: at least 9 elements; comprehensive: all 15 elements)   Constitutional  Vitals:  Most recent vital signs, dated less than 90 days prior to this appointment, were reviewed.    General:  unremarkable, age appropriate, casual attire, good eye contact, good rapport       Musculoskeletal  Muscle Strength/Tone:  no flaccidity, no tremor    Gait & Station:  normal      Psychiatric                       Speech:  normal tone, normal rate, rhythm, and volume   Mood & Affect:   Euthymic, congruent, appropriate         Thought Process:   Goal directed; Linear    Associations:   intact   Thought Content:   No SI/HI, delusions, or paranoia, no AV/VH   Insight & Judgement:   Good, adequate to circumstances   Orientation:   grossly intact; alert and oriented x 4    Memory:  intact for content of interview    Language:  grossly intact, can repeat    Attention Span  : Grossly intact for content of interview   Fund of Knowledge:   intact and appropriate to age and level of education        Assessment and Diagnosis   Status/Progress: Based on the examination today, the patient's problem(s) is/are under fair control.  New problems have not been presented today. Comorbidities are not currently complicating management of the primary condition.      Impression:Pt is a 34 year old female with past psychiatric hx of ADD who presents for follow up treatment. Pt est  "care with me 09/19 and met criteria for ADD. She is currently taking Vyvanse 20mg QD which was started during her last visit after trial of Adderall was intolerable. She notes that Vyvanse "made me a terrible person." and notes increase irritability. She notes ongoing anxiety due to raising three children, and middle daughter has spina bifida. She becomes tearful when discussing how overwhelmed she's been feeling. She does admit to neglecting self-care. She has been feeling restless, tense throughout the day. Uses Atarax PRN but causes too much drowsiness and would like a lower dose.       Diagnosis: ADD    Intervention/Counseling/Treatment Plan   · Medication Management:      1. Continue Adderall XR 10mg QD. Discussed risks of abuse potential, insomnia, anxiety, elevated BP, HR, arrthymias, MI, stroke, sudden death     2.  Continue hydroxyzine 25 mg QD PRN anxiety     3. Call to report any worsening of symptoms or problems with the medication. Pt instructed to go to ER with thoughts of harming self, others     4. Patient given contact # for psychotherapists at [facility] and also instructed she may check with insurance for list of providers.      5. Labs: no new orders    Psychotherapy:   · Target symptoms: inattention, distractibility, anxiety  · Why chosen therapy is appropriate versus another modality: relevant to diagnosis, patient responds to this modality  · Outcome monitoring methods: self-report, observation, feedback from family   · Therapeutic intervention type: supportive psychotherapy  · Topics discussed/themes: building skills sets for symptom management, symptom recognition, nutrition, exercise  · The patient's response to the intervention is accepting. The patient's progress toward treatment goals is positive progress.  · Duration of intervention: 20 minutes     Return to clinic:     -Spent 30min face to face with the pt; >50% time spent in counseling   -Supportive therapy and psychoeducation " provided  -R/B/SE's of medications discussed with the pt who expresses understanding and chooses to take medications as prescribed.   -Pt instructed to call clinic, 911 or go to nearest emergency room if sxs worsen or pt is in   crisis. The pt expresses understanding.    Vito Brantley, NP

## 2020-12-17 ENCOUNTER — PATIENT MESSAGE (OUTPATIENT)
Dept: PSYCHIATRY | Facility: CLINIC | Age: 35
End: 2020-12-17

## 2021-01-11 ENCOUNTER — OFFICE VISIT (OUTPATIENT)
Dept: PSYCHIATRY | Facility: CLINIC | Age: 36
End: 2021-01-11
Payer: COMMERCIAL

## 2021-01-11 VITALS
BODY MASS INDEX: 47.56 KG/M2 | DIASTOLIC BLOOD PRESSURE: 65 MMHG | WEIGHT: 278.56 LBS | HEIGHT: 64 IN | SYSTOLIC BLOOD PRESSURE: 115 MMHG | HEART RATE: 72 BPM

## 2021-01-11 DIAGNOSIS — F98.8 ATTENTION DEFICIT DISORDER, UNSPECIFIED HYPERACTIVITY PRESENCE: Primary | ICD-10-CM

## 2021-01-11 DIAGNOSIS — F39 UNSPECIFIED MOOD (AFFECTIVE) DISORDER: ICD-10-CM

## 2021-01-11 PROCEDURE — 1126F AMNT PAIN NOTED NONE PRSNT: CPT | Mod: S$GLB,,, | Performed by: NURSE PRACTITIONER

## 2021-01-11 PROCEDURE — 99999 PR PBB SHADOW E&M-EST. PATIENT-LVL III: CPT | Mod: PBBFAC,,, | Performed by: NURSE PRACTITIONER

## 2021-01-11 PROCEDURE — 90833 PSYTX W PT W E/M 30 MIN: CPT | Mod: S$GLB,,, | Performed by: NURSE PRACTITIONER

## 2021-01-11 PROCEDURE — 1126F PR PAIN SEVERITY QUANTIFIED, NO PAIN PRESENT: ICD-10-PCS | Mod: S$GLB,,, | Performed by: NURSE PRACTITIONER

## 2021-01-11 PROCEDURE — 90833 PR PSYCHOTHERAPY W/PATIENT W/E&M, 30 MIN (ADD ON): ICD-10-PCS | Mod: S$GLB,,, | Performed by: NURSE PRACTITIONER

## 2021-01-11 PROCEDURE — 99214 PR OFFICE/OUTPT VISIT, EST, LEVL IV, 30-39 MIN: ICD-10-PCS | Mod: S$GLB,,, | Performed by: NURSE PRACTITIONER

## 2021-01-11 PROCEDURE — 99999 PR PBB SHADOW E&M-EST. PATIENT-LVL III: ICD-10-PCS | Mod: PBBFAC,,, | Performed by: NURSE PRACTITIONER

## 2021-01-11 PROCEDURE — 3008F PR BODY MASS INDEX (BMI) DOCUMENTED: ICD-10-PCS | Mod: CPTII,S$GLB,, | Performed by: NURSE PRACTITIONER

## 2021-01-11 PROCEDURE — 99214 OFFICE O/P EST MOD 30 MIN: CPT | Mod: S$GLB,,, | Performed by: NURSE PRACTITIONER

## 2021-01-11 PROCEDURE — 3008F BODY MASS INDEX DOCD: CPT | Mod: CPTII,S$GLB,, | Performed by: NURSE PRACTITIONER

## 2021-01-11 RX ORDER — ESCITALOPRAM OXALATE 10 MG/1
10 TABLET ORAL DAILY
Qty: 30 TABLET | Refills: 1 | Status: SHIPPED | OUTPATIENT
Start: 2021-01-11 | End: 2021-02-17 | Stop reason: SDUPTHER

## 2021-02-08 ENCOUNTER — OFFICE VISIT (OUTPATIENT)
Dept: PSYCHIATRY | Facility: CLINIC | Age: 36
End: 2021-02-08
Payer: COMMERCIAL

## 2021-02-08 DIAGNOSIS — F98.8 ATTENTION DEFICIT DISORDER, UNSPECIFIED HYPERACTIVITY PRESENCE: Primary | ICD-10-CM

## 2021-02-08 DIAGNOSIS — F39 UNSPECIFIED MOOD (AFFECTIVE) DISORDER: ICD-10-CM

## 2021-02-08 PROCEDURE — 99214 PR OFFICE/OUTPT VISIT, EST, LEVL IV, 30-39 MIN: ICD-10-PCS | Mod: 95,,, | Performed by: NURSE PRACTITIONER

## 2021-02-08 PROCEDURE — 90833 PR PSYCHOTHERAPY W/PATIENT W/E&M, 30 MIN (ADD ON): ICD-10-PCS | Mod: 95,,, | Performed by: NURSE PRACTITIONER

## 2021-02-08 PROCEDURE — 90833 PSYTX W PT W E/M 30 MIN: CPT | Mod: 95,,, | Performed by: NURSE PRACTITIONER

## 2021-02-08 PROCEDURE — 99214 OFFICE O/P EST MOD 30 MIN: CPT | Mod: 95,,, | Performed by: NURSE PRACTITIONER

## 2021-03-15 ENCOUNTER — OFFICE VISIT (OUTPATIENT)
Dept: PSYCHIATRY | Facility: CLINIC | Age: 36
End: 2021-03-15
Payer: COMMERCIAL

## 2021-03-15 DIAGNOSIS — F98.8 ATTENTION DEFICIT DISORDER, UNSPECIFIED HYPERACTIVITY PRESENCE: Primary | ICD-10-CM

## 2021-03-15 DIAGNOSIS — F39 UNSPECIFIED MOOD (AFFECTIVE) DISORDER: ICD-10-CM

## 2021-03-15 PROCEDURE — 99214 PR OFFICE/OUTPT VISIT, EST, LEVL IV, 30-39 MIN: ICD-10-PCS | Mod: 95,,, | Performed by: NURSE PRACTITIONER

## 2021-03-15 PROCEDURE — 99214 OFFICE O/P EST MOD 30 MIN: CPT | Mod: 95,,, | Performed by: NURSE PRACTITIONER

## 2021-03-15 PROCEDURE — 90833 PSYTX W PT W E/M 30 MIN: CPT | Mod: 95,,, | Performed by: NURSE PRACTITIONER

## 2021-03-15 PROCEDURE — 90833 PR PSYCHOTHERAPY W/PATIENT W/E&M, 30 MIN (ADD ON): ICD-10-PCS | Mod: 95,,, | Performed by: NURSE PRACTITIONER

## 2021-03-15 RX ORDER — DEXTROAMPHETAMINE SACCHARATE, AMPHETAMINE ASPARTATE MONOHYDRATE, DEXTROAMPHETAMINE SULFATE AND AMPHETAMINE SULFATE 2.5; 2.5; 2.5; 2.5 MG/1; MG/1; MG/1; MG/1
10 CAPSULE, EXTENDED RELEASE ORAL EVERY MORNING
Qty: 30 CAPSULE | Refills: 0 | Status: SHIPPED | OUTPATIENT
Start: 2021-03-15 | End: 2021-04-26

## 2021-04-26 ENCOUNTER — OFFICE VISIT (OUTPATIENT)
Dept: PSYCHIATRY | Facility: CLINIC | Age: 36
End: 2021-04-26
Payer: COMMERCIAL

## 2021-04-26 DIAGNOSIS — F98.8 ATTENTION DEFICIT DISORDER, UNSPECIFIED HYPERACTIVITY PRESENCE: Primary | ICD-10-CM

## 2021-04-26 DIAGNOSIS — F39 UNSPECIFIED MOOD (AFFECTIVE) DISORDER: ICD-10-CM

## 2021-04-26 PROCEDURE — 99214 PR OFFICE/OUTPT VISIT, EST, LEVL IV, 30-39 MIN: ICD-10-PCS | Mod: 95,,, | Performed by: NURSE PRACTITIONER

## 2021-04-26 PROCEDURE — 90833 PR PSYCHOTHERAPY W/PATIENT W/E&M, 30 MIN (ADD ON): ICD-10-PCS | Mod: 95,,, | Performed by: NURSE PRACTITIONER

## 2021-04-26 PROCEDURE — 99214 OFFICE O/P EST MOD 30 MIN: CPT | Mod: 95,,, | Performed by: NURSE PRACTITIONER

## 2021-04-26 PROCEDURE — 90833 PSYTX W PT W E/M 30 MIN: CPT | Mod: 95,,, | Performed by: NURSE PRACTITIONER

## 2021-04-26 RX ORDER — DEXTROAMPHETAMINE SACCHARATE, AMPHETAMINE ASPARTATE MONOHYDRATE, DEXTROAMPHETAMINE SULFATE AND AMPHETAMINE SULFATE 3.75; 3.75; 3.75; 3.75 MG/1; MG/1; MG/1; MG/1
15 CAPSULE, EXTENDED RELEASE ORAL EVERY MORNING
Qty: 30 CAPSULE | Refills: 0 | Status: SHIPPED | OUTPATIENT
Start: 2021-04-26 | End: 2021-06-01 | Stop reason: SDUPTHER

## 2021-05-24 ENCOUNTER — PATIENT MESSAGE (OUTPATIENT)
Dept: PSYCHIATRY | Facility: CLINIC | Age: 36
End: 2021-05-24

## 2021-05-24 ENCOUNTER — TELEPHONE (OUTPATIENT)
Dept: PSYCHIATRY | Facility: CLINIC | Age: 36
End: 2021-05-24

## 2021-05-25 ENCOUNTER — PATIENT MESSAGE (OUTPATIENT)
Dept: PSYCHIATRY | Facility: CLINIC | Age: 36
End: 2021-05-25

## 2021-05-28 ENCOUNTER — OFFICE VISIT (OUTPATIENT)
Dept: PSYCHIATRY | Facility: CLINIC | Age: 36
End: 2021-05-28
Payer: COMMERCIAL

## 2021-05-28 DIAGNOSIS — F39 UNSPECIFIED MOOD (AFFECTIVE) DISORDER: ICD-10-CM

## 2021-05-28 DIAGNOSIS — F98.8 ATTENTION DEFICIT DISORDER, UNSPECIFIED HYPERACTIVITY PRESENCE: Primary | ICD-10-CM

## 2021-05-28 PROCEDURE — 90833 PSYTX W PT W E/M 30 MIN: CPT | Mod: 95,,, | Performed by: NURSE PRACTITIONER

## 2021-05-28 PROCEDURE — 99214 OFFICE O/P EST MOD 30 MIN: CPT | Mod: 95,,, | Performed by: NURSE PRACTITIONER

## 2021-05-28 PROCEDURE — 90833 PR PSYCHOTHERAPY W/PATIENT W/E&M, 30 MIN (ADD ON): ICD-10-PCS | Mod: 95,,, | Performed by: NURSE PRACTITIONER

## 2021-05-28 PROCEDURE — 99214 PR OFFICE/OUTPT VISIT, EST, LEVL IV, 30-39 MIN: ICD-10-PCS | Mod: 95,,, | Performed by: NURSE PRACTITIONER

## 2021-05-28 RX ORDER — ESCITALOPRAM OXALATE 10 MG/1
10 TABLET ORAL DAILY
Qty: 30 TABLET | Refills: 2 | Status: SHIPPED | OUTPATIENT
Start: 2021-05-28 | End: 2021-06-26 | Stop reason: SDUPTHER

## 2021-06-28 ENCOUNTER — TELEPHONE (OUTPATIENT)
Dept: PSYCHIATRY | Facility: CLINIC | Age: 36
End: 2021-06-28

## 2021-06-28 ENCOUNTER — PATIENT MESSAGE (OUTPATIENT)
Dept: PSYCHIATRY | Facility: CLINIC | Age: 36
End: 2021-06-28

## 2021-07-12 ENCOUNTER — PATIENT MESSAGE (OUTPATIENT)
Dept: PSYCHIATRY | Facility: CLINIC | Age: 36
End: 2021-07-12

## 2021-07-12 RX ORDER — DEXTROAMPHETAMINE SACCHARATE, AMPHETAMINE ASPARTATE MONOHYDRATE, DEXTROAMPHETAMINE SULFATE AND AMPHETAMINE SULFATE 3.75; 3.75; 3.75; 3.75 MG/1; MG/1; MG/1; MG/1
15 CAPSULE, EXTENDED RELEASE ORAL EVERY MORNING
Qty: 30 CAPSULE | Refills: 0 | Status: SHIPPED | OUTPATIENT
Start: 2021-07-12 | End: 2021-08-16 | Stop reason: SDUPTHER

## 2021-08-11 ENCOUNTER — TELEPHONE (OUTPATIENT)
Dept: ORTHOPEDICS | Facility: CLINIC | Age: 36
End: 2021-08-11

## 2021-08-13 DIAGNOSIS — M79.671 RIGHT FOOT PAIN: Primary | ICD-10-CM

## 2021-08-16 ENCOUNTER — HOSPITAL ENCOUNTER (OUTPATIENT)
Dept: RADIOLOGY | Facility: HOSPITAL | Age: 36
Discharge: HOME | End: 2021-08-16
Attending: ORTHOPAEDIC SURGERY
Payer: COMMERCIAL

## 2021-08-16 ENCOUNTER — OFFICE VISIT (OUTPATIENT)
Dept: ORTHOPEDICS | Facility: CLINIC | Age: 36
End: 2021-08-16
Payer: COMMERCIAL

## 2021-08-16 ENCOUNTER — OFFICE VISIT (OUTPATIENT)
Dept: PSYCHIATRY | Facility: CLINIC | Age: 36
End: 2021-08-16
Payer: COMMERCIAL

## 2021-08-16 VITALS
SYSTOLIC BLOOD PRESSURE: 116 MMHG | HEART RATE: 75 BPM | HEIGHT: 64 IN | DIASTOLIC BLOOD PRESSURE: 78 MMHG | WEIGHT: 281.44 LBS | BODY MASS INDEX: 48.05 KG/M2

## 2021-08-16 DIAGNOSIS — M25.571 RIGHT ANKLE PAIN, UNSPECIFIED CHRONICITY: ICD-10-CM

## 2021-08-16 DIAGNOSIS — F39 UNSPECIFIED MOOD (AFFECTIVE) DISORDER: Primary | ICD-10-CM

## 2021-08-16 DIAGNOSIS — M79.671 RIGHT FOOT PAIN: Primary | ICD-10-CM

## 2021-08-16 DIAGNOSIS — M25.571 RIGHT ANKLE PAIN: ICD-10-CM

## 2021-08-16 DIAGNOSIS — F98.8 ATTENTION DEFICIT DISORDER, UNSPECIFIED HYPERACTIVITY PRESENCE: ICD-10-CM

## 2021-08-16 DIAGNOSIS — M79.671 RIGHT FOOT PAIN: ICD-10-CM

## 2021-08-16 PROCEDURE — 73610 X-RAY EXAM OF ANKLE: CPT | Mod: 26,RT,, | Performed by: RADIOLOGY

## 2021-08-16 PROCEDURE — 1160F PR REVIEW ALL MEDS BY PRESCRIBER/CLIN PHARMACIST DOCUMENTED: ICD-10-PCS | Mod: CPTII,,, | Performed by: NURSE PRACTITIONER

## 2021-08-16 PROCEDURE — 3074F SYST BP LT 130 MM HG: CPT | Mod: CPTII,S$GLB,, | Performed by: ORTHOPAEDIC SURGERY

## 2021-08-16 PROCEDURE — 99999 PR PBB SHADOW E&M-EST. PATIENT-LVL III: CPT | Mod: PBBFAC,,, | Performed by: ORTHOPAEDIC SURGERY

## 2021-08-16 PROCEDURE — 90833 PSYTX W PT W E/M 30 MIN: CPT | Mod: 95,,, | Performed by: NURSE PRACTITIONER

## 2021-08-16 PROCEDURE — 99214 OFFICE O/P EST MOD 30 MIN: CPT | Mod: S$GLB,,, | Performed by: ORTHOPAEDIC SURGERY

## 2021-08-16 PROCEDURE — 1159F MED LIST DOCD IN RCRD: CPT | Mod: CPTII,S$GLB,, | Performed by: ORTHOPAEDIC SURGERY

## 2021-08-16 PROCEDURE — 1125F AMNT PAIN NOTED PAIN PRSNT: CPT | Mod: CPTII,S$GLB,, | Performed by: ORTHOPAEDIC SURGERY

## 2021-08-16 PROCEDURE — 99214 OFFICE O/P EST MOD 30 MIN: CPT | Mod: 95,,, | Performed by: NURSE PRACTITIONER

## 2021-08-16 PROCEDURE — 90833 PR PSYCHOTHERAPY W/PATIENT W/E&M, 30 MIN (ADD ON): ICD-10-PCS | Mod: 95,,, | Performed by: NURSE PRACTITIONER

## 2021-08-16 PROCEDURE — 1159F PR MEDICATION LIST DOCUMENTED IN MEDICAL RECORD: ICD-10-PCS | Mod: CPTII,,, | Performed by: NURSE PRACTITIONER

## 2021-08-16 PROCEDURE — 99214 PR OFFICE/OUTPT VISIT, EST, LEVL IV, 30-39 MIN: ICD-10-PCS | Mod: S$GLB,,, | Performed by: ORTHOPAEDIC SURGERY

## 2021-08-16 PROCEDURE — 1160F RVW MEDS BY RX/DR IN RCRD: CPT | Mod: CPTII,,, | Performed by: NURSE PRACTITIONER

## 2021-08-16 PROCEDURE — 3078F PR MOST RECENT DIASTOLIC BLOOD PRESSURE < 80 MM HG: ICD-10-PCS | Mod: CPTII,S$GLB,, | Performed by: ORTHOPAEDIC SURGERY

## 2021-08-16 PROCEDURE — 73630 X-RAY EXAM OF FOOT: CPT | Mod: 26,RT,, | Performed by: RADIOLOGY

## 2021-08-16 PROCEDURE — 1125F PR PAIN SEVERITY QUANTIFIED, PAIN PRESENT: ICD-10-PCS | Mod: CPTII,S$GLB,, | Performed by: ORTHOPAEDIC SURGERY

## 2021-08-16 PROCEDURE — 3078F DIAST BP <80 MM HG: CPT | Mod: CPTII,S$GLB,, | Performed by: ORTHOPAEDIC SURGERY

## 2021-08-16 PROCEDURE — 3008F BODY MASS INDEX DOCD: CPT | Mod: CPTII,S$GLB,, | Performed by: ORTHOPAEDIC SURGERY

## 2021-08-16 PROCEDURE — 3008F PR BODY MASS INDEX (BMI) DOCUMENTED: ICD-10-PCS | Mod: CPTII,S$GLB,, | Performed by: ORTHOPAEDIC SURGERY

## 2021-08-16 PROCEDURE — 1159F MED LIST DOCD IN RCRD: CPT | Mod: CPTII,,, | Performed by: NURSE PRACTITIONER

## 2021-08-16 PROCEDURE — 1160F RVW MEDS BY RX/DR IN RCRD: CPT | Mod: CPTII,S$GLB,, | Performed by: ORTHOPAEDIC SURGERY

## 2021-08-16 PROCEDURE — 1160F PR REVIEW ALL MEDS BY PRESCRIBER/CLIN PHARMACIST DOCUMENTED: ICD-10-PCS | Mod: CPTII,S$GLB,, | Performed by: ORTHOPAEDIC SURGERY

## 2021-08-16 PROCEDURE — 99999 PR PBB SHADOW E&M-EST. PATIENT-LVL III: ICD-10-PCS | Mod: PBBFAC,,, | Performed by: ORTHOPAEDIC SURGERY

## 2021-08-16 PROCEDURE — 1159F PR MEDICATION LIST DOCUMENTED IN MEDICAL RECORD: ICD-10-PCS | Mod: CPTII,S$GLB,, | Performed by: ORTHOPAEDIC SURGERY

## 2021-08-16 PROCEDURE — 73610 XR ANKLE COMPLETE 3 VIEW RIGHT: ICD-10-PCS | Mod: 26,RT,, | Performed by: RADIOLOGY

## 2021-08-16 PROCEDURE — 73630 XR FOOT COMPLETE 3 VIEW RIGHT: ICD-10-PCS | Mod: 26,RT,, | Performed by: RADIOLOGY

## 2021-08-16 PROCEDURE — 99214 PR OFFICE/OUTPT VISIT, EST, LEVL IV, 30-39 MIN: ICD-10-PCS | Mod: 95,,, | Performed by: NURSE PRACTITIONER

## 2021-08-16 PROCEDURE — 73610 X-RAY EXAM OF ANKLE: CPT | Mod: TC,PO,RT

## 2021-08-16 PROCEDURE — 73630 X-RAY EXAM OF FOOT: CPT | Mod: TC,PO,RT

## 2021-08-16 PROCEDURE — 3074F PR MOST RECENT SYSTOLIC BLOOD PRESSURE < 130 MM HG: ICD-10-PCS | Mod: CPTII,S$GLB,, | Performed by: ORTHOPAEDIC SURGERY

## 2021-08-16 RX ORDER — DEXTROAMPHETAMINE SACCHARATE, AMPHETAMINE ASPARTATE MONOHYDRATE, DEXTROAMPHETAMINE SULFATE AND AMPHETAMINE SULFATE 3.75; 3.75; 3.75; 3.75 MG/1; MG/1; MG/1; MG/1
15 CAPSULE, EXTENDED RELEASE ORAL EVERY MORNING
Qty: 30 CAPSULE | Refills: 0 | Status: SHIPPED | OUTPATIENT
Start: 2021-08-16 | End: 2021-09-21 | Stop reason: SDUPTHER

## 2021-09-13 ENCOUNTER — OFFICE VISIT (OUTPATIENT)
Dept: ORTHOPEDICS | Facility: CLINIC | Age: 36
End: 2021-09-13
Payer: COMMERCIAL

## 2021-09-13 VITALS
HEIGHT: 64 IN | BODY MASS INDEX: 47.97 KG/M2 | DIASTOLIC BLOOD PRESSURE: 82 MMHG | HEART RATE: 77 BPM | WEIGHT: 281 LBS | SYSTOLIC BLOOD PRESSURE: 116 MMHG

## 2021-09-13 DIAGNOSIS — G56.03 CARPAL TUNNEL SYNDROME, BILATERAL: Primary | ICD-10-CM

## 2021-09-13 PROCEDURE — 1160F PR REVIEW ALL MEDS BY PRESCRIBER/CLIN PHARMACIST DOCUMENTED: ICD-10-PCS | Mod: CPTII,S$GLB,, | Performed by: ORTHOPAEDIC SURGERY

## 2021-09-13 PROCEDURE — 99999 PR PBB SHADOW E&M-EST. PATIENT-LVL III: ICD-10-PCS | Mod: PBBFAC,,, | Performed by: ORTHOPAEDIC SURGERY

## 2021-09-13 PROCEDURE — 3079F PR MOST RECENT DIASTOLIC BLOOD PRESSURE 80-89 MM HG: ICD-10-PCS | Mod: CPTII,S$GLB,, | Performed by: ORTHOPAEDIC SURGERY

## 2021-09-13 PROCEDURE — 1159F MED LIST DOCD IN RCRD: CPT | Mod: CPTII,S$GLB,, | Performed by: ORTHOPAEDIC SURGERY

## 2021-09-13 PROCEDURE — 3079F DIAST BP 80-89 MM HG: CPT | Mod: CPTII,S$GLB,, | Performed by: ORTHOPAEDIC SURGERY

## 2021-09-13 PROCEDURE — 1159F PR MEDICATION LIST DOCUMENTED IN MEDICAL RECORD: ICD-10-PCS | Mod: CPTII,S$GLB,, | Performed by: ORTHOPAEDIC SURGERY

## 2021-09-13 PROCEDURE — 1160F RVW MEDS BY RX/DR IN RCRD: CPT | Mod: CPTII,S$GLB,, | Performed by: ORTHOPAEDIC SURGERY

## 2021-09-13 PROCEDURE — 3008F BODY MASS INDEX DOCD: CPT | Mod: CPTII,S$GLB,, | Performed by: ORTHOPAEDIC SURGERY

## 2021-09-13 PROCEDURE — 3008F PR BODY MASS INDEX (BMI) DOCUMENTED: ICD-10-PCS | Mod: CPTII,S$GLB,, | Performed by: ORTHOPAEDIC SURGERY

## 2021-09-13 PROCEDURE — 99999 PR PBB SHADOW E&M-EST. PATIENT-LVL III: CPT | Mod: PBBFAC,,, | Performed by: ORTHOPAEDIC SURGERY

## 2021-09-13 PROCEDURE — 3074F SYST BP LT 130 MM HG: CPT | Mod: CPTII,S$GLB,, | Performed by: ORTHOPAEDIC SURGERY

## 2021-09-13 PROCEDURE — 99203 PR OFFICE/OUTPT VISIT, NEW, LEVL III, 30-44 MIN: ICD-10-PCS | Mod: S$GLB,,, | Performed by: ORTHOPAEDIC SURGERY

## 2021-09-13 PROCEDURE — 3074F PR MOST RECENT SYSTOLIC BLOOD PRESSURE < 130 MM HG: ICD-10-PCS | Mod: CPTII,S$GLB,, | Performed by: ORTHOPAEDIC SURGERY

## 2021-09-13 PROCEDURE — 99203 OFFICE O/P NEW LOW 30 MIN: CPT | Mod: S$GLB,,, | Performed by: ORTHOPAEDIC SURGERY

## 2021-09-13 RX ORDER — MELOXICAM 15 MG/1
15 TABLET ORAL DAILY
Qty: 30 TABLET | Refills: 0 | Status: SHIPPED | OUTPATIENT
Start: 2021-09-13 | End: 2021-10-13

## 2021-09-21 ENCOUNTER — PATIENT MESSAGE (OUTPATIENT)
Dept: ORTHOPEDICS | Facility: CLINIC | Age: 36
End: 2021-09-21

## 2021-09-21 RX ORDER — DEXTROAMPHETAMINE SACCHARATE, AMPHETAMINE ASPARTATE MONOHYDRATE, DEXTROAMPHETAMINE SULFATE AND AMPHETAMINE SULFATE 3.75; 3.75; 3.75; 3.75 MG/1; MG/1; MG/1; MG/1
15 CAPSULE, EXTENDED RELEASE ORAL EVERY MORNING
Qty: 30 CAPSULE | Refills: 0 | Status: SHIPPED | OUTPATIENT
Start: 2021-09-21 | End: 2021-10-28 | Stop reason: SDUPTHER

## 2021-11-15 ENCOUNTER — PATIENT MESSAGE (OUTPATIENT)
Dept: PSYCHIATRY | Facility: CLINIC | Age: 36
End: 2021-11-15
Payer: COMMERCIAL

## 2021-11-23 ENCOUNTER — OFFICE VISIT (OUTPATIENT)
Dept: PSYCHIATRY | Facility: CLINIC | Age: 36
End: 2021-11-23
Payer: COMMERCIAL

## 2021-11-23 VITALS
HEART RATE: 73 BPM | SYSTOLIC BLOOD PRESSURE: 119 MMHG | DIASTOLIC BLOOD PRESSURE: 68 MMHG | HEIGHT: 64 IN | WEIGHT: 272.81 LBS | BODY MASS INDEX: 46.57 KG/M2

## 2021-11-23 DIAGNOSIS — F39 UNSPECIFIED MOOD (AFFECTIVE) DISORDER: Primary | ICD-10-CM

## 2021-11-23 DIAGNOSIS — F98.8 ATTENTION DEFICIT DISORDER, UNSPECIFIED HYPERACTIVITY PRESENCE: ICD-10-CM

## 2021-11-23 PROCEDURE — 90833 PSYTX W PT W E/M 30 MIN: CPT | Mod: S$GLB,,, | Performed by: NURSE PRACTITIONER

## 2021-11-23 PROCEDURE — 99214 OFFICE O/P EST MOD 30 MIN: CPT | Mod: S$GLB,,, | Performed by: NURSE PRACTITIONER

## 2021-11-23 PROCEDURE — 90833 PR PSYCHOTHERAPY W/PATIENT W/E&M, 30 MIN (ADD ON): ICD-10-PCS | Mod: S$GLB,,, | Performed by: NURSE PRACTITIONER

## 2021-11-23 PROCEDURE — 99214 PR OFFICE/OUTPT VISIT, EST, LEVL IV, 30-39 MIN: ICD-10-PCS | Mod: S$GLB,,, | Performed by: NURSE PRACTITIONER

## 2021-11-23 PROCEDURE — 99999 PR PBB SHADOW E&M-EST. PATIENT-LVL III: CPT | Mod: PBBFAC,,, | Performed by: NURSE PRACTITIONER

## 2021-11-23 PROCEDURE — 99999 PR PBB SHADOW E&M-EST. PATIENT-LVL III: ICD-10-PCS | Mod: PBBFAC,,, | Performed by: NURSE PRACTITIONER

## 2022-02-08 ENCOUNTER — OFFICE VISIT (OUTPATIENT)
Dept: PSYCHIATRY | Facility: CLINIC | Age: 37
End: 2022-02-08
Payer: COMMERCIAL

## 2022-02-08 DIAGNOSIS — F98.8 ATTENTION DEFICIT DISORDER, UNSPECIFIED HYPERACTIVITY PRESENCE: ICD-10-CM

## 2022-02-08 DIAGNOSIS — F39 UNSPECIFIED MOOD (AFFECTIVE) DISORDER: Primary | ICD-10-CM

## 2022-02-08 PROCEDURE — 1160F RVW MEDS BY RX/DR IN RCRD: CPT | Mod: CPTII,,, | Performed by: NURSE PRACTITIONER

## 2022-02-08 PROCEDURE — 90833 PR PSYCHOTHERAPY W/PATIENT W/E&M, 30 MIN (ADD ON): ICD-10-PCS | Mod: ,,, | Performed by: NURSE PRACTITIONER

## 2022-02-08 PROCEDURE — 1159F PR MEDICATION LIST DOCUMENTED IN MEDICAL RECORD: ICD-10-PCS | Mod: CPTII,,, | Performed by: NURSE PRACTITIONER

## 2022-02-08 PROCEDURE — 1160F PR REVIEW ALL MEDS BY PRESCRIBER/CLIN PHARMACIST DOCUMENTED: ICD-10-PCS | Mod: CPTII,,, | Performed by: NURSE PRACTITIONER

## 2022-02-08 PROCEDURE — 99214 OFFICE O/P EST MOD 30 MIN: CPT | Mod: ,,, | Performed by: NURSE PRACTITIONER

## 2022-02-08 PROCEDURE — 1159F MED LIST DOCD IN RCRD: CPT | Mod: CPTII,,, | Performed by: NURSE PRACTITIONER

## 2022-02-08 PROCEDURE — 99214 PR OFFICE/OUTPT VISIT, EST, LEVL IV, 30-39 MIN: ICD-10-PCS | Mod: ,,, | Performed by: NURSE PRACTITIONER

## 2022-02-08 PROCEDURE — 90833 PSYTX W PT W E/M 30 MIN: CPT | Mod: ,,, | Performed by: NURSE PRACTITIONER

## 2022-02-08 RX ORDER — DEXTROAMPHETAMINE SACCHARATE, AMPHETAMINE ASPARTATE MONOHYDRATE, DEXTROAMPHETAMINE SULFATE AND AMPHETAMINE SULFATE 5; 5; 5; 5 MG/1; MG/1; MG/1; MG/1
20 CAPSULE, EXTENDED RELEASE ORAL EVERY MORNING
Qty: 30 CAPSULE | Refills: 0 | Status: SHIPPED | OUTPATIENT
Start: 2022-02-08 | End: 2022-03-18 | Stop reason: SDUPTHER

## 2022-02-08 NOTE — PROGRESS NOTES
"Outpatient Psychiatry Follow-Up Visit    Clinical Status of Patient: Outpatient (Virtual)  02/08/2022   [address] [place] [address]  [phone] (M)     Chief Complaint: Pt is a 36 year old female who presents today for a follow-up. Met with patient.       Interval History and Content of Current Session:  Interim Events/Subjective Report/Content of Current Session:  follow up appointment.    Pt is a 36 year old female with past psychiatric hx of ADD, "post partum depression", mood disorder NOS who presents for follow up treatment. She is currently taking Lexapro 10mg QD, Adderall XR 15mg QD. Meds tolerated well and provide good symptomatic relief overall.    Between visits, pt notes that her mood and anxiety have been well-managed. She states that despite some ongoing life stressors, she is doing "really well emotionally". She denies any major decompensations of depression, and denies much generalized or ruminative worrying.     She does continue with poor focus and being easily distracted, which negatively impacts her ability to function both at home and work. She has tolerated 15mg Adderall XR well and is requesting an increased dose.         Past Psychiatric hx: Pt. is a 36 year old female with a past psychiatric hx of ADD who established care with me 09/19. Patient also has a past medical history of morbid obesity. She presented to me not taking any psychiatric meds but does report on previous trial of Adderall which was therapeutic and well-tolerated - d/c this upon becoming pregnant about 4 years ago. Now going back to work and feels she would like to re-start her ADD medications.      Pt speaks to a long hx of inattention and distractibility that began in childhood and prior to age 10. "I was never able to sit still in class and was always thinking about everything except what they were saying. I've always been late to everything and forgetful." States that she has struggled with poor focus, " "and inability to stay task-oriented. Has been a procrastinator in the past. This has affected her at home in completing chores and normal household duties. States that she is "totally unable to sit still." She was eventually diagnosed with ADD about 7 years ago by her PCP, and started on Adderall with good efficacy and tolerability. "It changed my life. I got so many things accomplished. I could finally complete everything that's on my list."     Has a 1 and 3 year old. Her 1 year old has been diagnosed with spina bifida and hydrocephalus, requiring lots of at-home care. Her  travels frequently with the , and "we unexpectedly had a special needs child, and I have felt totally overwhelmed and like I'm carrying the weight of the world." States that her child had 6 major surgeries before she was 6 months old - has been totally overwhelming for her. Her  was deployed during her pregnancy and when they discovered the child's diagnosis.     Denies any hx of depression. Denies anhedonia, denies feelings of of hopelessness and worthlessness. Expresses some guilt that "what happened to by daughter may be kind of my fault". Dec'd sleep quality, gets around 4-5 hours of sleep but attributes this to "teething kids." Endorses fatigue, "I feel like I'm always tired" and endorses poor concentration. Appetite good. Denies PMA. Denies SI/intent plan.     Attributes most of her anxiety to situational stressors r/t to raising her daughter. Denies generalized or excessive worrying outside of daily tasks/responsibilities. Dec'd energy and cocnentration. Denies feeling restless or tense. Does endorses some irritability.       Past Medical hx:   Past Medical History:   Diagnosis Date    ADD (attention deficit disorder)     Morbid obesity 12/4/2017    Obesity     Plantar fasciitis 5/30/2012        Interim hx:  Medication changes last visit: none  Anxiety: denies  Depression: denies     Denies suicidal/homicidal " "ideations.  Denies hopelessness/worthlessness.    Denies auditory/visual hallucinations      Tobacco: former smoker, quit x 6 years  Alcohol: "couldn't tell you the last time I had a drink"  Drug use: denies  Caffeine: 2 cups of coffee daily      Review of Systems   · PSYCHIATRIC: Pertinent items are noted in the narrative.        CONSTITUTIONAL: weight stable        M/S: no pain today         ENT: no allergies noted today        ABD: no n/v/d     Past Medical, Family and Social History: The patient's past medical, family and social history have been reviewed and updated as appropriate within the electronic medical record. See encounter notes.     Medication: Adderall XR 10mg QD, lexapro 10mg qhd     Compliance: yes      Side effects: tolerates     Risk Parameters:  Patient reports no suicidal ideation  Patient reports no homicidal ideation  Patient reports no self-injurious behavior  Patient reports no violent behavior     Exam (detailed: at least 9 elements; comprehensive: all 15 elements)   Constitutional  Vitals:  Most recent vital signs, dated less than 90 days prior to this appointment, were reviewed. There were no vitals taken for this visit.     General:  unremarkable, age appropriate, casual attire, good eye contact, good rapport       Musculoskeletal  Muscle Strength/Tone:  no flaccidity, no tremor    Gait & Station:  normal      Psychiatric                       Speech:  normal tone, normal rate, rhythm, and volume   Mood & Affect:   Euthymic, congruent, appropriate         Thought Process:   Goal directed; Linear    Associations:   intact   Thought Content:   No SI/HI, delusions, or paranoia, no AV/VH   Insight & Judgement:   Good, adequate to circumstances   Orientation:   grossly intact; alert and oriented x 4    Memory:  intact for content of interview    Language:  grossly intact, can repeat    Attention Span  : Grossly intact for content of interview   Fund of Knowledge:   intact and appropriate to " "age and level of education        Assessment and Diagnosis   Status/Progress: Based on the examination today, the patient's problem(s) is/are under fair control.  New problems have not been presented today. Comorbidities are not currently complicating management of the primary condition.      Impression:        Pt is a 36 year old female with past psychiatric hx of ADD, "post partum depression", mood disorder NOS who presents for follow up treatment. She is currently taking Lexapro 10mg QD, Adderall XR 15mg QD. Meds tolerated well and provide good symptomatic relief overall.    Between visits, pt notes that her mood and anxiety have been well-managed. She states that despite some ongoing life stressors, she is doing "really well emotionally". She denies any major decompensations of depression, and denies much generalized or ruminative worrying.     She does continue with poor focus and being easily distracted, which negatively impacts her ability to function both at home and work. She has tolerated 15mg Adderall XR well and is requesting an increased dose.           Diagnosis: ADD, mood disorder unspecified    Intervention/Counseling/Treatment Plan   · Medication Management:      1. Increase Adderall XR to 20mg QD. Discussed risks of abuse potential, insomnia, anxiety, elevated BP, HR, arrthymias, MI, stroke, sudden death     2. Continue Lexapro 10mg QD for mood/anxiety.  Discussed potential for GI side effects, sexual dysfunction, mood destabilization, headaches     3. Call to report any worsening of symptoms or problems with the medication. Pt instructed to go to ER with thoughts of harming self, others     4. Patient given contact # for psychotherapists at [facility] and also instructed she may check with insurance for list of providers.      5. Labs: no new orders    Psychotherapy:   · Target symptoms: inattention, distractibility, anxiety  · Why chosen therapy is appropriate versus another modality: " relevant to diagnosis, patient responds to this modality  · Outcome monitoring methods: self-report, observation, feedback from family   · Therapeutic intervention type: supportive psychotherapy  · Topics discussed/themes: building skills sets for symptom management, symptom recognition, nutrition, exercise  · The patient's response to the intervention is accepting. The patient's progress toward treatment goals is positive progress.  · Duration of intervention: 20 minutes     Return to clinic: 4 weeks self    -Spent 30min face to face with the pt; >50% time spent in counseling   -Supportive therapy and psychoeducation provided  -R/B/SE's of medications discussed with the pt who expresses understanding and chooses to take medications as prescribed.   -Pt instructed to call clinic, 911 or go to nearest emergency room if sxs worsen or pt is in   crisis. The pt expresses understanding.    Vito Brantley, NP

## 2022-02-11 ENCOUNTER — PATIENT MESSAGE (OUTPATIENT)
Dept: PSYCHIATRY | Facility: CLINIC | Age: 37
End: 2022-02-11
Payer: COMMERCIAL

## 2022-03-18 RX ORDER — DEXTROAMPHETAMINE SACCHARATE, AMPHETAMINE ASPARTATE MONOHYDRATE, DEXTROAMPHETAMINE SULFATE AND AMPHETAMINE SULFATE 5; 5; 5; 5 MG/1; MG/1; MG/1; MG/1
20 CAPSULE, EXTENDED RELEASE ORAL EVERY MORNING
Qty: 30 CAPSULE | Refills: 0 | Status: SHIPPED | OUTPATIENT
Start: 2022-03-18 | End: 2022-04-21 | Stop reason: SDUPTHER

## 2022-03-21 ENCOUNTER — OFFICE VISIT (OUTPATIENT)
Dept: PSYCHIATRY | Facility: CLINIC | Age: 37
End: 2022-03-21
Payer: COMMERCIAL

## 2022-03-21 DIAGNOSIS — F98.8 ATTENTION DEFICIT DISORDER, UNSPECIFIED HYPERACTIVITY PRESENCE: ICD-10-CM

## 2022-03-21 DIAGNOSIS — F39 UNSPECIFIED MOOD (AFFECTIVE) DISORDER: Primary | ICD-10-CM

## 2022-03-21 PROCEDURE — 90833 PR PSYCHOTHERAPY W/PATIENT W/E&M, 30 MIN (ADD ON): ICD-10-PCS | Mod: 95,,, | Performed by: NURSE PRACTITIONER

## 2022-03-21 PROCEDURE — 1160F PR REVIEW ALL MEDS BY PRESCRIBER/CLIN PHARMACIST DOCUMENTED: ICD-10-PCS | Mod: CPTII,95,, | Performed by: NURSE PRACTITIONER

## 2022-03-21 PROCEDURE — 99214 PR OFFICE/OUTPT VISIT, EST, LEVL IV, 30-39 MIN: ICD-10-PCS | Mod: 95,,, | Performed by: NURSE PRACTITIONER

## 2022-03-21 PROCEDURE — 1159F PR MEDICATION LIST DOCUMENTED IN MEDICAL RECORD: ICD-10-PCS | Mod: CPTII,95,, | Performed by: NURSE PRACTITIONER

## 2022-03-21 PROCEDURE — 1160F RVW MEDS BY RX/DR IN RCRD: CPT | Mod: CPTII,95,, | Performed by: NURSE PRACTITIONER

## 2022-03-21 PROCEDURE — 99214 OFFICE O/P EST MOD 30 MIN: CPT | Mod: 95,,, | Performed by: NURSE PRACTITIONER

## 2022-03-21 PROCEDURE — 90833 PSYTX W PT W E/M 30 MIN: CPT | Mod: 95,,, | Performed by: NURSE PRACTITIONER

## 2022-03-21 PROCEDURE — 1159F MED LIST DOCD IN RCRD: CPT | Mod: CPTII,95,, | Performed by: NURSE PRACTITIONER

## 2022-03-21 NOTE — PROGRESS NOTES
"Outpatient Psychiatry Follow-Up Visit    Clinical Status of Patient: Outpatient (Virtual)  03/21/2022   [address] [place] [address]  [phone] (M)     Chief Complaint: Pt is a 36 year old female who presents today for a follow-up. Met with patient.       Interval History and Content of Current Session:  Interim Events/Subjective Report/Content of Current Session:  follow up appointment.    Pt is a 36 year old female with past psychiatric hx of ADD, "post partum depression", mood disorder NOS who presents for follow up treatment. She is currently taking Lexapro 10mg QD, Adderall XR 20mg QD. Meds tolerated well and provide good symptomatic relief overall.    Sicne last visit, pt notes that she has been feeling a bit more "emotional, and crying for no reason". However, pt does admit that her mother's passing anniversary is approaching. She suspects that this has been her main trigger. Besides this, she denies any major decompensations of depression or anxiety. Sleeping well, good appetite. Adderall effective in managing her ADD symptoms.           Past Psychiatric hx: Pt. is a 36 year old female with a past psychiatric hx of ADD who established care with me 09/19. Patient also has a past medical history of morbid obesity. She presented to me not taking any psychiatric meds but does report on previous trial of Adderall which was therapeutic and well-tolerated - d/c this upon becoming pregnant about 4 years ago. Now going back to work and feels she would like to re-start her ADD medications.      Pt speaks to a long hx of inattention and distractibility that began in childhood and prior to age 10. "I was never able to sit still in class and was always thinking about everything except what they were saying. I've always been late to everything and forgetful." States that she has struggled with poor focus, and inability to stay task-oriented. Has been a procrastinator in the past. This has affected her " "at home in completing chores and normal household duties. States that she is "totally unable to sit still." She was eventually diagnosed with ADD about 7 years ago by her PCP, and started on Adderall with good efficacy and tolerability. "It changed my life. I got so many things accomplished. I could finally complete everything that's on my list."     Has a 1 and 3 year old. Her 1 year old has been diagnosed with spina bifida and hydrocephalus, requiring lots of at-home care. Her  travels frequently with the , and "we unexpectedly had a special needs child, and I have felt totally overwhelmed and like I'm carrying the weight of the world." States that her child had 6 major surgeries before she was 6 months old - has been totally overwhelming for her. Her  was deployed during her pregnancy and when they discovered the child's diagnosis.     Denies any hx of depression. Denies anhedonia, denies feelings of of hopelessness and worthlessness. Expresses some guilt that "what happened to by daughter may be kind of my fault". Dec'd sleep quality, gets around 4-5 hours of sleep but attributes this to "teething kids." Endorses fatigue, "I feel like I'm always tired" and endorses poor concentration. Appetite good. Denies PMA. Denies SI/intent plan.     Attributes most of her anxiety to situational stressors r/t to raising her daughter. Denies generalized or excessive worrying outside of daily tasks/responsibilities. Dec'd energy and cocnentration. Denies feeling restless or tense. Does endorses some irritability.       Past Medical hx:   Past Medical History:   Diagnosis Date    ADD (attention deficit disorder)     Morbid obesity 12/4/2017    Obesity     Plantar fasciitis 5/30/2012        Interim hx:  Medication changes last visit: none  Anxiety: denies  Depression: denies     Denies suicidal/homicidal ideations.  Denies hopelessness/worthlessness.    Denies auditory/visual hallucinations    " "  Tobacco: former smoker, quit x 6 years  Alcohol: "couldn't tell you the last time I had a drink"  Drug use: denies  Caffeine: 2 cups of coffee daily      Review of Systems   · PSYCHIATRIC: Pertinent items are noted in the narrative.        CONSTITUTIONAL: weight stable        M/S: no pain today         ENT: no allergies noted today        ABD: no n/v/d     Past Medical, Family and Social History: The patient's past medical, family and social history have been reviewed and updated as appropriate within the electronic medical record. See encounter notes.     Medication: Adderall XR 10mg QD, lexapro 10mg qhd     Compliance: yes      Side effects: tolerates     Risk Parameters:  Patient reports no suicidal ideation  Patient reports no homicidal ideation  Patient reports no self-injurious behavior  Patient reports no violent behavior     Exam (detailed: at least 9 elements; comprehensive: all 15 elements)   Constitutional  Vitals:  Most recent vital signs, dated less than 90 days prior to this appointment, were reviewed. There were no vitals taken for this visit.     General:  unremarkable, age appropriate, casual attire, good eye contact, good rapport       Musculoskeletal  Muscle Strength/Tone:  no flaccidity, no tremor    Gait & Station:  normal      Psychiatric                       Speech:  normal tone, normal rate, rhythm, and volume   Mood & Affect:   Euthymic, congruent, appropriate         Thought Process:   Goal directed; Linear    Associations:   intact   Thought Content:   No SI/HI, delusions, or paranoia, no AV/VH   Insight & Judgement:   Good, adequate to circumstances   Orientation:   grossly intact; alert and oriented x 4    Memory:  intact for content of interview    Language:  grossly intact, can repeat    Attention Span  : Grossly intact for content of interview   Fund of Knowledge:   intact and appropriate to age and level of education        Assessment and Diagnosis   Status/Progress: Based on the " "examination today, the patient's problem(s) is/are under fair control.  New problems have not been presented today. Comorbidities are not currently complicating management of the primary condition.      Impression:    Pt is a 36 year old female with past psychiatric hx of ADD, "post partum depression", mood disorder NOS who presents for follow up treatment. She is currently taking Lexapro 10mg QD, Adderall XR 20mg QD. Meds tolerated well and provide good symptomatic relief overall.    Sicne last visit, pt notes that she has been feeling a bit more "emotional, and crying for no reason". However, pt does admit that her mother's passing anniversary is approaching. She suspects that this has been her main trigger. Besides this, she denies any major decompensations of depression or anxiety. Sleeping well, good appetite. Adderall effective in managing her ADD symptoms.               Diagnosis: ADD, mood disorder unspecified    Intervention/Counseling/Treatment Plan   · Medication Management:      1. Cont Adderall XR 20mg QD. Discussed risks of abuse potential, insomnia, anxiety, elevated BP, HR, arrthymias, MI, stroke, sudden death     2. Continue Lexapro 10mg QD for mood/anxiety.  Discussed potential for GI side effects, sexual dysfunction, mood destabilization, headaches     3. Call to report any worsening of symptoms or problems with the medication. Pt instructed to go to ER with thoughts of harming self, others     4. Patient given contact # for psychotherapists at [facility] and also instructed she may check with insurance for list of providers.      5. Labs: no new orders    Psychotherapy:   · Target symptoms: inattention, distractibility, anxiety  · Why chosen therapy is appropriate versus another modality: relevant to diagnosis, patient responds to this modality  · Outcome monitoring methods: self-report, observation, feedback from family   · Therapeutic intervention type: supportive psychotherapy  · Topics " discussed/themes: building skills sets for symptom management, symptom recognition, nutrition, exercise  · The patient's response to the intervention is accepting. The patient's progress toward treatment goals is positive progress.  · Duration of intervention: 20 minutes     Return to clinic: 2 month - self    -Spent 30min face to face with the pt; >50% time spent in counseling   -Supportive therapy and psychoeducation provided  -R/B/SE's of medications discussed with the pt who expresses understanding and chooses to take medications as prescribed.   -Pt instructed to call clinic, 911 or go to nearest emergency room if sxs worsen or pt is in   crisis. The pt expresses understanding.    Vito Brantley, NP

## 2022-05-20 RX ORDER — DEXTROAMPHETAMINE SACCHARATE, AMPHETAMINE ASPARTATE MONOHYDRATE, DEXTROAMPHETAMINE SULFATE AND AMPHETAMINE SULFATE 5; 5; 5; 5 MG/1; MG/1; MG/1; MG/1
20 CAPSULE, EXTENDED RELEASE ORAL EVERY MORNING
Qty: 30 CAPSULE | Refills: 0 | Status: SHIPPED | OUTPATIENT
Start: 2022-05-20 | End: 2022-06-30 | Stop reason: SDUPTHER

## 2022-06-30 ENCOUNTER — OFFICE VISIT (OUTPATIENT)
Dept: PSYCHIATRY | Facility: CLINIC | Age: 37
End: 2022-06-30
Payer: COMMERCIAL

## 2022-06-30 DIAGNOSIS — F39 UNSPECIFIED MOOD (AFFECTIVE) DISORDER: Primary | ICD-10-CM

## 2022-06-30 DIAGNOSIS — F98.8 ATTENTION DEFICIT DISORDER, UNSPECIFIED HYPERACTIVITY PRESENCE: ICD-10-CM

## 2022-06-30 PROCEDURE — 1160F PR REVIEW ALL MEDS BY PRESCRIBER/CLIN PHARMACIST DOCUMENTED: ICD-10-PCS | Mod: CPTII,95,, | Performed by: NURSE PRACTITIONER

## 2022-06-30 PROCEDURE — 1159F PR MEDICATION LIST DOCUMENTED IN MEDICAL RECORD: ICD-10-PCS | Mod: CPTII,95,, | Performed by: NURSE PRACTITIONER

## 2022-06-30 PROCEDURE — 99214 OFFICE O/P EST MOD 30 MIN: CPT | Mod: 95,,, | Performed by: NURSE PRACTITIONER

## 2022-06-30 PROCEDURE — 1159F MED LIST DOCD IN RCRD: CPT | Mod: CPTII,95,, | Performed by: NURSE PRACTITIONER

## 2022-06-30 PROCEDURE — 99214 PR OFFICE/OUTPT VISIT, EST, LEVL IV, 30-39 MIN: ICD-10-PCS | Mod: 95,,, | Performed by: NURSE PRACTITIONER

## 2022-06-30 PROCEDURE — 1160F RVW MEDS BY RX/DR IN RCRD: CPT | Mod: CPTII,95,, | Performed by: NURSE PRACTITIONER

## 2022-06-30 RX ORDER — DEXTROAMPHETAMINE SACCHARATE, AMPHETAMINE ASPARTATE MONOHYDRATE, DEXTROAMPHETAMINE SULFATE AND AMPHETAMINE SULFATE 5; 5; 5; 5 MG/1; MG/1; MG/1; MG/1
20 CAPSULE, EXTENDED RELEASE ORAL EVERY MORNING
Qty: 30 CAPSULE | Refills: 0 | Status: SHIPPED | OUTPATIENT
Start: 2022-06-30 | End: 2022-07-31 | Stop reason: SDUPTHER

## 2022-06-30 RX ORDER — ESCITALOPRAM OXALATE 20 MG/1
20 TABLET ORAL DAILY
Qty: 30 TABLET | Refills: 1 | Status: SHIPPED | OUTPATIENT
Start: 2022-06-30 | End: 2022-08-02

## 2022-06-30 NOTE — PROGRESS NOTES
"Outpatient Psychiatry Follow-Up Visit    Clinical Status of Patient: Outpatient (Virtual)  06/30/2022   [address] [place] [address]  [phone] (M)     Chief Complaint: Pt is a 36 year old female who presents today for a follow-up. Met with patient.       Interval History and Content of Current Session:  Interim Events/Subjective Report/Content of Current Session:  follow up appointment.    Pt is a 36 year old female with past psychiatric hx of ADD, "post partum depression", mood disorder NOS who presents for follow up treatment. She is currently taking Lexapro 10mg QD, Adderall XR 20mg QD. Meds tolerated well and provide good symptomatic relief overall.    Sicne last visit, pt notes increased anxiety and depression secondary to ongoing life stressors. She notes generalized, ruminative worrying that often interferes with her ability to fall and stay asleep. She is frequently tearful and easily overwhelmed. Campbell hopelessness or worthlessness and is functioning well overall. ADD symptoms well-managed. Pt stable and well-functioning.         Past Psychiatric hx: Pt. is a 36 year old female with a past psychiatric hx of ADD who established care with me 09/19. Patient also has a past medical history of morbid obesity. She presented to me not taking any psychiatric meds but does report on previous trial of Adderall which was therapeutic and well-tolerated - d/c this upon becoming pregnant about 4 years ago. Now going back to work and feels she would like to re-start her ADD medications.      Pt speaks to a long hx of inattention and distractibility that began in childhood and prior to age 10. "I was never able to sit still in class and was always thinking about everything except what they were saying. I've always been late to everything and forgetful." States that she has struggled with poor focus, and inability to stay task-oriented. Has been a procrastinator in the past. This has affected her at home " "in completing chores and normal household duties. States that she is "totally unable to sit still." She was eventually diagnosed with ADD about 7 years ago by her PCP, and started on Adderall with good efficacy and tolerability. "It changed my life. I got so many things accomplished. I could finally complete everything that's on my list."     Has a 1 and 3 year old. Her 1 year old has been diagnosed with spina bifida and hydrocephalus, requiring lots of at-home care. Her  travels frequently with the , and "we unexpectedly had a special needs child, and I have felt totally overwhelmed and like I'm carrying the weight of the world." States that her child had 6 major surgeries before she was 6 months old - has been totally overwhelming for her. Her  was deployed during her pregnancy and when they discovered the child's diagnosis.     Denies any hx of depression. Denies anhedonia, denies feelings of of hopelessness and worthlessness. Expresses some guilt that "what happened to by daughter may be kind of my fault". Dec'd sleep quality, gets around 4-5 hours of sleep but attributes this to "teething kids." Endorses fatigue, "I feel like I'm always tired" and endorses poor concentration. Appetite good. Denies PMA. Denies SI/intent plan.     Attributes most of her anxiety to situational stressors r/t to raising her daughter. Denies generalized or excessive worrying outside of daily tasks/responsibilities. Dec'd energy and cocnentration. Denies feeling restless or tense. Does endorses some irritability.       Past Medical hx:   Past Medical History:   Diagnosis Date    ADD (attention deficit disorder)     Morbid obesity 12/4/2017    Obesity     Plantar fasciitis 5/30/2012        Interim hx:  Medication changes last visit: none  Anxiety: denies  Depression: denies     Denies suicidal/homicidal ideations.  Denies hopelessness/worthlessness.    Denies auditory/visual hallucinations      Tobacco: former " "smoker, quit x 6 years  Alcohol: "couldn't tell you the last time I had a drink"  Drug use: denies  Caffeine: 2 cups of coffee daily      Review of Systems   · PSYCHIATRIC: Pertinent items are noted in the narrative.        CONSTITUTIONAL: weight stable        M/S: no pain today         ENT: no allergies noted today        ABD: no n/v/d     Past Medical, Family and Social History: The patient's past medical, family and social history have been reviewed and updated as appropriate within the electronic medical record. See encounter notes.     Medication: Adderall XR 10mg QD, lexapro 10mg qhd     Compliance: yes      Side effects: tolerates     Risk Parameters:  Patient reports no suicidal ideation  Patient reports no homicidal ideation  Patient reports no self-injurious behavior  Patient reports no violent behavior     Exam (detailed: at least 9 elements; comprehensive: all 15 elements)   Constitutional  Vitals:  Most recent vital signs, dated less than 90 days prior to this appointment, were reviewed. There were no vitals taken for this visit.     General:  unremarkable, age appropriate, casual attire, good eye contact, good rapport       Musculoskeletal  Muscle Strength/Tone:  no flaccidity, no tremor    Gait & Station:  normal      Psychiatric                       Speech:  normal tone, normal rate, rhythm, and volume   Mood & Affect:   Euthymic, congruent, appropriate         Thought Process:   Goal directed; Linear    Associations:   intact   Thought Content:   No SI/HI, delusions, or paranoia, no AV/VH   Insight & Judgement:   Good, adequate to circumstances   Orientation:   grossly intact; alert and oriented x 4    Memory:  intact for content of interview    Language:  grossly intact, can repeat    Attention Span  : Grossly intact for content of interview   Fund of Knowledge:   intact and appropriate to age and level of education        Assessment and Diagnosis   Status/Progress: Based on the examination today, " "the patient's problem(s) is/are under fair control.  New problems have not been presented today. Comorbidities are not currently complicating management of the primary condition.      Impression:      Pt is a 36 year old female with past psychiatric hx of ADD, "post partum depression", mood disorder NOS who presents for follow up treatment. She is currently taking Lexapro 10mg QD, Adderall XR 20mg QD. Meds tolerated well and provide good symptomatic relief overall.    Sicne last visit, pt notes increased anxiety and depression secondary to ongoing life stressors. She notes generalized, ruminative worrying that often interferes with her ability to fall and stay asleep. She is frequently tearful and easily overwhelmed. Campbell hopelessness or worthlessness and is functioning well overall. ADD symptoms well-managed. Pt stable and well-functioning.       Diagnosis: ADD, mood disorder unspecified    Intervention/Counseling/Treatment Plan   · Medication Management:      1. Cont Adderall XR 20mg QD. Discussed risks of abuse potential, insomnia, anxiety, elevated BP, HR, arrthymias, MI, stroke, sudden death     2. Increase Lexapro to 20mg QD for mood/anxiety.  Discussed potential for GI side effects, sexual dysfunction, mood destabilization, headaches     3. Call to report any worsening of symptoms or problems with the medication. Pt instructed to go to ER with thoughts of harming self, others     4. Patient given contact # for psychotherapists at [facility] and also instructed she may check with insurance for list of providers.      5. Labs: no new orders     Return to clinic: 1 month - self    -Spent 30min face to face with the pt; >50% time spent in counseling   -Supportive therapy and psychoeducation provided  -R/B/SE's of medications discussed with the pt who expresses understanding and chooses to take medications as prescribed.   -Pt instructed to call clinic, 911 or go to nearest emergency room if sxs worsen or " pt is in   crisis. The pt expresses understanding.    Vito Brantley, NP  ·

## 2022-08-29 ENCOUNTER — PATIENT MESSAGE (OUTPATIENT)
Dept: PSYCHIATRY | Facility: CLINIC | Age: 37
End: 2022-08-29
Payer: COMMERCIAL

## 2022-09-12 ENCOUNTER — TELEPHONE (OUTPATIENT)
Dept: PSYCHIATRY | Facility: CLINIC | Age: 37
End: 2022-09-12
Payer: COMMERCIAL

## 2022-09-12 NOTE — TELEPHONE ENCOUNTER
Completed PA for Adderall XR 20 mg capsules #30/ 30 day supply. Awaiting determination from plan.

## 2022-10-07 ENCOUNTER — PATIENT MESSAGE (OUTPATIENT)
Dept: PSYCHIATRY | Facility: CLINIC | Age: 37
End: 2022-10-07
Payer: COMMERCIAL

## 2022-10-07 RX ORDER — DEXTROAMPHETAMINE SACCHARATE, AMPHETAMINE ASPARTATE MONOHYDRATE, DEXTROAMPHETAMINE SULFATE AND AMPHETAMINE SULFATE 5; 5; 5; 5 MG/1; MG/1; MG/1; MG/1
20 CAPSULE, EXTENDED RELEASE ORAL EVERY MORNING
Qty: 30 CAPSULE | Refills: 0 | Status: SHIPPED | OUTPATIENT
Start: 2022-10-07 | End: 2022-11-06 | Stop reason: SDUPTHER

## 2022-10-07 RX ORDER — ESCITALOPRAM OXALATE 20 MG/1
20 TABLET ORAL DAILY
Qty: 30 TABLET | Refills: 1 | Status: SHIPPED | OUTPATIENT
Start: 2022-10-07 | End: 2022-11-06 | Stop reason: SDUPTHER

## 2022-10-10 ENCOUNTER — OFFICE VISIT (OUTPATIENT)
Dept: PSYCHIATRY | Facility: CLINIC | Age: 37
End: 2022-10-10
Payer: COMMERCIAL

## 2022-10-10 DIAGNOSIS — F39 UNSPECIFIED MOOD (AFFECTIVE) DISORDER: Primary | ICD-10-CM

## 2022-10-10 DIAGNOSIS — F98.8 ATTENTION DEFICIT DISORDER, UNSPECIFIED HYPERACTIVITY PRESENCE: ICD-10-CM

## 2022-10-10 PROCEDURE — 99999 PR PBB SHADOW E&M-EST. PATIENT-LVL II: CPT | Mod: PBBFAC,,, | Performed by: NURSE PRACTITIONER

## 2022-10-10 PROCEDURE — 99999 PR PBB SHADOW E&M-EST. PATIENT-LVL II: ICD-10-PCS | Mod: PBBFAC,,, | Performed by: NURSE PRACTITIONER

## 2022-10-10 PROCEDURE — 1160F RVW MEDS BY RX/DR IN RCRD: CPT | Mod: CPTII,95,, | Performed by: NURSE PRACTITIONER

## 2022-10-10 PROCEDURE — 1159F MED LIST DOCD IN RCRD: CPT | Mod: CPTII,95,, | Performed by: NURSE PRACTITIONER

## 2022-10-10 PROCEDURE — 99214 OFFICE O/P EST MOD 30 MIN: CPT | Mod: 95,,, | Performed by: NURSE PRACTITIONER

## 2022-10-10 PROCEDURE — 99214 PR OFFICE/OUTPT VISIT, EST, LEVL IV, 30-39 MIN: ICD-10-PCS | Mod: 95,,, | Performed by: NURSE PRACTITIONER

## 2022-10-10 PROCEDURE — 1159F PR MEDICATION LIST DOCUMENTED IN MEDICAL RECORD: ICD-10-PCS | Mod: CPTII,95,, | Performed by: NURSE PRACTITIONER

## 2022-10-10 PROCEDURE — 1160F PR REVIEW ALL MEDS BY PRESCRIBER/CLIN PHARMACIST DOCUMENTED: ICD-10-PCS | Mod: CPTII,95,, | Performed by: NURSE PRACTITIONER

## 2022-10-10 NOTE — PROGRESS NOTES
"Outpatient Psychiatry Follow-Up Visit    Clinical Status of Patient: Outpatient (Virtual)  10/10/2022   [address] [place] [address]  [phone] (M)     Chief Complaint: Pt is a 36 year old female who presents today for a follow-up. Met with patient.       Interval History and Content of Current Session:  Interim Events/Subjective Report/Content of Current Session:  follow up appointment.    Pt is a 37 year old female with past psychiatric hx of ADD, "post partum depression", mood disorder NOS who presents for follow up treatment. She is currently taking Lexapro 10mg QD, Adderall XR 20mg QD. Meds tolerated well and provide good symptomatic relief overall.    Between visits, pt notes that is doing very well overall. She notes continued high levels of stress but feels she is managing well. Pt does notes that that her sleep has been negatively impacted due to stress. She denies any major decompensations of depression and denies any hopelessness. ADD symptoms well-controlled with current dose of Adderall XR. Pt stable and high functioning.         Past Psychiatric hx: Pt. is a 36 year old female with a past psychiatric hx of ADD who established care with me 09/19. Patient also has a past medical history of morbid obesity. She presented to me not taking any psychiatric meds but does report on previous trial of Adderall which was therapeutic and well-tolerated - d/c this upon becoming pregnant about 4 years ago. Now going back to work and feels she would like to re-start her ADD medications.      Pt speaks to a long hx of inattention and distractibility that began in childhood and prior to age 10. "I was never able to sit still in class and was always thinking about everything except what they were saying. I've always been late to everything and forgetful." States that she has struggled with poor focus, and inability to stay task-oriented. Has been a procrastinator in the past. This has affected her at " "home in completing chores and normal household duties. States that she is "totally unable to sit still." She was eventually diagnosed with ADD about 7 years ago by her PCP, and started on Adderall with good efficacy and tolerability. "It changed my life. I got so many things accomplished. I could finally complete everything that's on my list."     Has a 1 and 3 year old. Her 1 year old has been diagnosed with spina bifida and hydrocephalus, requiring lots of at-home care. Her  travels frequently with the , and "we unexpectedly had a special needs child, and I have felt totally overwhelmed and like I'm carrying the weight of the world." States that her child had 6 major surgeries before she was 6 months old - has been totally overwhelming for her. Her  was deployed during her pregnancy and when they discovered the child's diagnosis.     Denies any hx of depression. Denies anhedonia, denies feelings of of hopelessness and worthlessness. Expresses some guilt that "what happened to by daughter may be kind of my fault". Dec'd sleep quality, gets around 4-5 hours of sleep but attributes this to "teething kids." Endorses fatigue, "I feel like I'm always tired" and endorses poor concentration. Appetite good. Denies PMA. Denies SI/intent plan.     Attributes most of her anxiety to situational stressors r/t to raising her daughter. Denies generalized or excessive worrying outside of daily tasks/responsibilities. Dec'd energy and cocnentration. Denies feeling restless or tense. Does endorses some irritability.       Past Medical hx:   Past Medical History:   Diagnosis Date    ADD (attention deficit disorder)     Morbid obesity 12/4/2017    Obesity     Plantar fasciitis 5/30/2012        Interim hx:  Medication changes last visit: none  Anxiety: denies  Depression: denies     Denies suicidal/homicidal ideations.  Denies hopelessness/worthlessness.    Denies auditory/visual hallucinations      Tobacco: " "former smoker, quit x 6 years  Alcohol: "couldn't tell you the last time I had a drink"  Drug use: denies  Caffeine: 2 cups of coffee daily      Review of Systems   PSYCHIATRIC: Pertinent items are noted in the narrative.        CONSTITUTIONAL: weight stable        M/S: no pain today         ENT: no allergies noted today        ABD: no n/v/d     Past Medical, Family and Social History: The patient's past medical, family and social history have been reviewed and updated as appropriate within the electronic medical record. See encounter notes.     Medication: Adderall XR 10mg QD, lexapro 10mg qhd     Compliance: yes      Side effects: tolerates     Risk Parameters:  Patient reports no suicidal ideation  Patient reports no homicidal ideation  Patient reports no self-injurious behavior  Patient reports no violent behavior     Exam (detailed: at least 9 elements; comprehensive: all 15 elements)   Constitutional  Vitals:  Most recent vital signs, dated less than 90 days prior to this appointment, were reviewed. There were no vitals taken for this visit.     General:  unremarkable, age appropriate, casual attire, good eye contact, good rapport       Musculoskeletal  Muscle Strength/Tone:  no flaccidity, no tremor    Gait & Station:  normal      Psychiatric                       Speech:  normal tone, normal rate, rhythm, and volume   Mood & Affect:   Euthymic, congruent, appropriate         Thought Process:   Goal directed; Linear    Associations:   intact   Thought Content:   No SI/HI, delusions, or paranoia, no AV/VH   Insight & Judgement:   Good, adequate to circumstances   Orientation:   grossly intact; alert and oriented x 4    Memory:  intact for content of interview    Language:  grossly intact, can repeat    Attention Span  : Grossly intact for content of interview   Fund of Knowledge:   intact and appropriate to age and level of education        Assessment and Diagnosis   Status/Progress: Based on the examination " "today, the patient's problem(s) is/are under fair control.  New problems have not been presented today. Comorbidities are not currently complicating management of the primary condition.      Impression:        Pt is a 37 year old female with past psychiatric hx of ADD, "post partum depression", mood disorder NOS who presents for follow up treatment. She is currently taking Lexapro 10mg QD, Adderall XR 20mg QD. Meds tolerated well and provide good symptomatic relief overall.    Between visits, pt notes that is doing very well overall. She notes continued high levels of stress but feels she is managing well. Pt does notes that that her sleep has been negatively impacted due to stress. She denies any major decompensations of depression and denies any hopelessness. ADD symptoms well-controlled with current dose of Adderall XR. Pt stable and high functioning.       Diagnosis: ADD, mood disorder unspecified    Intervention/Counseling/Treatment Plan   Medication Management:      1. Cont Adderall XR 20mg QD. Discussed risks of abuse potential, insomnia, anxiety, elevated BP, HR, arrthymias, MI, stroke, sudden death     2. Cont Lexapro 20mg QD for mood/anxiety.  Discussed potential for GI side effects, sexual dysfunction, mood destabilization, headaches     3. Call to report any worsening of symptoms or problems with the medication. Pt instructed to go to ER with thoughts of harming self, others     4. Patient given contact # for psychotherapists at [facility] and also instructed she may check with insurance for list of providers.      5. Labs: no new orders     Return to clinic: 1 month - self    -Spent 30min face to face with the pt; >50% time spent in counseling   -Supportive therapy and psychoeducation provided  -R/B/SE's of medications discussed with the pt who expresses understanding and chooses to take medications as prescribed.   -Pt instructed to call clinic, 911 or go to nearest emergency room if sxs worsen " or pt is in   crisis. The pt expresses understanding.    Vito Brantley, NP

## 2022-12-06 ENCOUNTER — PATIENT MESSAGE (OUTPATIENT)
Dept: PSYCHIATRY | Facility: CLINIC | Age: 37
End: 2022-12-06
Payer: COMMERCIAL

## 2023-01-06 ENCOUNTER — OFFICE VISIT (OUTPATIENT)
Dept: PSYCHIATRY | Facility: CLINIC | Age: 38
End: 2023-01-06
Payer: COMMERCIAL

## 2023-01-06 DIAGNOSIS — F39 UNSPECIFIED MOOD (AFFECTIVE) DISORDER: Primary | ICD-10-CM

## 2023-01-06 PROCEDURE — 1159F MED LIST DOCD IN RCRD: CPT | Mod: CPTII,95,, | Performed by: NURSE PRACTITIONER

## 2023-01-06 PROCEDURE — 99214 PR OFFICE/OUTPT VISIT, EST, LEVL IV, 30-39 MIN: ICD-10-PCS | Mod: 95,,, | Performed by: NURSE PRACTITIONER

## 2023-01-06 PROCEDURE — 1159F PR MEDICATION LIST DOCUMENTED IN MEDICAL RECORD: ICD-10-PCS | Mod: CPTII,95,, | Performed by: NURSE PRACTITIONER

## 2023-01-06 PROCEDURE — 1160F PR REVIEW ALL MEDS BY PRESCRIBER/CLIN PHARMACIST DOCUMENTED: ICD-10-PCS | Mod: CPTII,95,, | Performed by: NURSE PRACTITIONER

## 2023-01-06 PROCEDURE — 99214 OFFICE O/P EST MOD 30 MIN: CPT | Mod: 95,,, | Performed by: NURSE PRACTITIONER

## 2023-01-06 PROCEDURE — 1160F RVW MEDS BY RX/DR IN RCRD: CPT | Mod: CPTII,95,, | Performed by: NURSE PRACTITIONER

## 2023-01-06 RX ORDER — DEXTROAMPHETAMINE SACCHARATE, AMPHETAMINE ASPARTATE MONOHYDRATE, DEXTROAMPHETAMINE SULFATE AND AMPHETAMINE SULFATE 5; 5; 5; 5 MG/1; MG/1; MG/1; MG/1
20 CAPSULE, EXTENDED RELEASE ORAL EVERY MORNING
Qty: 30 CAPSULE | Refills: 0 | Status: SHIPPED | OUTPATIENT
Start: 2023-01-06 | End: 2023-02-06 | Stop reason: SDUPTHER

## 2023-01-06 NOTE — PROGRESS NOTES
"Outpatient Psychiatry Follow-Up Visit    Clinical Status of Patient: Outpatient (Virtual)  01/06/2023   [address] [place] [address]  [phone] (M)     Chief Complaint: Pt is a 36 year old female who presents today for a follow-up. Met with patient.       Interval History and Content of Current Session:  Interim Events/Subjective Report/Content of Current Session:  follow up appointment.    Pt is a 37 year old female with past psychiatric hx of ADD, "post partum depression", mood disorder NOS who presents for follow up treatment. She is currently taking Lexapro 20mg QD, Adderall XR 20mg QD. Meds tolerated well and provide good symptomatic relief overall. However pt did report that she self tapered to 10mg QD.     Since last visit, pt reports that she has been doing well. Her mood is good, and she has been without any major decompensations of depression since last visit. ADD symptoms well-managed. Able to focus and complete tasks effectively. Sleeping well, good energy and appetite. PT stable and high functioing.    Past Psychiatric hx: Pt. is a 36 year old female with a past psychiatric hx of ADD who established care with me 09/19. Patient also has a past medical history of morbid obesity. She presented to me not taking any psychiatric meds but does report on previous trial of Adderall which was therapeutic and well-tolerated - d/c this upon becoming pregnant about 4 years ago. Now going back to work and feels she would like to re-start her ADD medications.      Pt speaks to a long hx of inattention and distractibility that began in childhood and prior to age 10. "I was never able to sit still in class and was always thinking about everything except what they were saying. I've always been late to everything and forgetful." States that she has struggled with poor focus, and inability to stay task-oriented. Has been a procrastinator in the past. This has affected her at home in completing chores and " "normal household duties. States that she is "totally unable to sit still." She was eventually diagnosed with ADD about 7 years ago by her PCP, and started on Adderall with good efficacy and tolerability. "It changed my life. I got so many things accomplished. I could finally complete everything that's on my list."     Has a 1 and 3 year old. Her 1 year old has been diagnosed with spina bifida and hydrocephalus, requiring lots of at-home care. Her  travels frequently with the , and "we unexpectedly had a special needs child, and I have felt totally overwhelmed and like I'm carrying the weight of the world." States that her child had 6 major surgeries before she was 6 months old - has been totally overwhelming for her. Her  was deployed during her pregnancy and when they discovered the child's diagnosis.     Denies any hx of depression. Denies anhedonia, denies feelings of of hopelessness and worthlessness. Expresses some guilt that "what happened to by daughter may be kind of my fault". Dec'd sleep quality, gets around 4-5 hours of sleep but attributes this to "teething kids." Endorses fatigue, "I feel like I'm always tired" and endorses poor concentration. Appetite good. Denies PMA. Denies SI/intent plan.     Attributes most of her anxiety to situational stressors r/t to raising her daughter. Denies generalized or excessive worrying outside of daily tasks/responsibilities. Dec'd energy and cocnentration. Denies feeling restless or tense. Does endorses some irritability.       Past Medical hx:   Past Medical History:   Diagnosis Date    ADD (attention deficit disorder)     Morbid obesity 12/4/2017    Obesity     Plantar fasciitis 5/30/2012        Interim hx:  Medication changes last visit: none  Anxiety: denies  Depression: denies     Denies suicidal/homicidal ideations.  Denies hopelessness/worthlessness.    Denies auditory/visual hallucinations      Tobacco: former smoker, quit x 6 " "years  Alcohol: "couldn't tell you the last time I had a drink"  Drug use: denies  Caffeine: 2 cups of coffee daily      Review of Systems   PSYCHIATRIC: Pertinent items are noted in the narrative.        CONSTITUTIONAL: weight stable        M/S: no pain today         ENT: no allergies noted today        ABD: no n/v/d     Past Medical, Family and Social History: The patient's past medical, family and social history have been reviewed and updated as appropriate within the electronic medical record. See encounter notes.     Medication: Adderall XR 10mg QD, lexapro 10mg qhd     Compliance: yes      Side effects: tolerates     Risk Parameters:  Patient reports no suicidal ideation  Patient reports no homicidal ideation  Patient reports no self-injurious behavior  Patient reports no violent behavior     Exam (detailed: at least 9 elements; comprehensive: all 15 elements)   Constitutional  Vitals:  Most recent vital signs, dated less than 90 days prior to this appointment, were reviewed. There were no vitals taken for this visit.     General:  unremarkable, age appropriate, casual attire, good eye contact, good rapport       Musculoskeletal  Muscle Strength/Tone:  no flaccidity, no tremor    Gait & Station:  normal      Psychiatric                       Speech:  normal tone, normal rate, rhythm, and volume   Mood & Affect:   Euthymic, congruent, appropriate         Thought Process:   Goal directed; Linear    Associations:   intact   Thought Content:   No SI/HI, delusions, or paranoia, no AV/VH   Insight & Judgement:   Good, adequate to circumstances   Orientation:   grossly intact; alert and oriented x 4    Memory:  intact for content of interview    Language:  grossly intact, can repeat    Attention Span  : Grossly intact for content of interview   Fund of Knowledge:   intact and appropriate to age and level of education        Assessment and Diagnosis   Status/Progress: Based on the examination today, the patient's " "problem(s) is/are under fair control.  New problems have not been presented today. Comorbidities are not currently complicating management of the primary condition.      Impression:        Pt is a 37 year old female with past psychiatric hx of ADD, "post partum depression", mood disorder NOS who presents for follow up treatment. She is currently taking Lexapro 10mg QD, Adderall XR 20mg QD. Meds tolerated well and provide good symptomatic relief overall.    Between visits, pt notes that is doing very well overall. She notes continued high levels of stress but feels she is managing well. Pt does notes that that her sleep has been negatively impacted due to stress. She denies any major decompensations of depression and denies any hopelessness. ADD symptoms well-controlled with current dose of Adderall XR. Pt stable and high functioning.       Diagnosis: ADD, mood disorder unspecified    Intervention/Counseling/Treatment Plan   Medication Management:      1. Cont Adderall XR 20mg QD. Discussed risks of abuse potential, insomnia, anxiety, elevated BP, HR, arrthymias, MI, stroke, sudden death     2. Cont Lexapro 10mg QD for mood/anxiety.  Discussed potential for GI side effects, sexual dysfunction, mood destabilization, headaches     3. Call to report any worsening of symptoms or problems with the medication. Pt instructed to go to ER with thoughts of harming self, others     4. Patient given contact # for psychotherapists at [facility] and also instructed she may check with insurance for list of providers.      5. Labs: no new orders     Return to clinic: 1 month - self    -Spent 30min face to face with the pt; >50% time spent in counseling   -Supportive therapy and psychoeducation provided  -R/B/SE's of medications discussed with the pt who expresses understanding and chooses to take medications as prescribed.   -Pt instructed to call clinic, 911 or go to nearest emergency room if sxs worsen or pt is in   crisis. " The pt expresses understanding.    Vito Brantley, NP

## 2023-03-30 ENCOUNTER — PATIENT MESSAGE (OUTPATIENT)
Dept: PSYCHIATRY | Facility: CLINIC | Age: 38
End: 2023-03-30
Payer: COMMERCIAL

## 2023-03-31 ENCOUNTER — OFFICE VISIT (OUTPATIENT)
Dept: PSYCHIATRY | Facility: CLINIC | Age: 38
End: 2023-03-31
Payer: COMMERCIAL

## 2023-03-31 DIAGNOSIS — F98.8 ATTENTION DEFICIT DISORDER, UNSPECIFIED HYPERACTIVITY PRESENCE: Primary | ICD-10-CM

## 2023-03-31 PROCEDURE — 1160F RVW MEDS BY RX/DR IN RCRD: CPT | Mod: CPTII,95,, | Performed by: NURSE PRACTITIONER

## 2023-03-31 PROCEDURE — 1159F PR MEDICATION LIST DOCUMENTED IN MEDICAL RECORD: ICD-10-PCS | Mod: CPTII,95,, | Performed by: NURSE PRACTITIONER

## 2023-03-31 PROCEDURE — 99214 OFFICE O/P EST MOD 30 MIN: CPT | Mod: 95,,, | Performed by: NURSE PRACTITIONER

## 2023-03-31 PROCEDURE — 1159F MED LIST DOCD IN RCRD: CPT | Mod: CPTII,95,, | Performed by: NURSE PRACTITIONER

## 2023-03-31 PROCEDURE — 99214 PR OFFICE/OUTPT VISIT, EST, LEVL IV, 30-39 MIN: ICD-10-PCS | Mod: 95,,, | Performed by: NURSE PRACTITIONER

## 2023-03-31 PROCEDURE — 1160F PR REVIEW ALL MEDS BY PRESCRIBER/CLIN PHARMACIST DOCUMENTED: ICD-10-PCS | Mod: CPTII,95,, | Performed by: NURSE PRACTITIONER

## 2023-03-31 NOTE — PROGRESS NOTES
"Outpatient Psychiatry Follow-Up Visit    Clinical Status of Patient: Outpatient (Virtual)  03/31/2023   [address] [place] [address]  [phone] (M)     Chief Complaint: Pt is a 36 year old female who presents today for a follow-up. Met with patient.       Interval History and Content of Current Session:  Interim Events/Subjective Report/Content of Current Session:  follow up appointment.    Pt is a 38 year old female with past psychiatric hx of ADD, "post partum depression", mood disorder NOS who presents for follow up treatment. She is currently taking Lexapro 20mg QD, Adderall XR 20mg QD. Meds tolerated well and provide good symptomatic relief overall. However pt did report that she self tapered to 10mg QD.     Since last visit, pt notes she's been feeling slightly more stressed than usual but adds this is circumstantial, as she recently accepted a new part time job. However, she feels much more fulfilled and notes "I feel like I found my sense of purpose again." Mood is good and denies any overt symptoms of depression today. Sleeping well, good appetite.     ADD symptoms well-managed. Pt stable, high funcitoning.     Past Psychiatric hx: Pt. is a 36 year old female with a past psychiatric hx of ADD who established care with me 09/19. Patient also has a past medical history of morbid obesity. She presented to me not taking any psychiatric meds but does report on previous trial of Adderall which was therapeutic and well-tolerated - d/c this upon becoming pregnant about 4 years ago. Now going back to work and feels she would like to re-start her ADD medications.      Pt speaks to a long hx of inattention and distractibility that began in childhood and prior to age 10. "I was never able to sit still in class and was always thinking about everything except what they were saying. I've always been late to everything and forgetful." States that she has struggled with poor focus, and inability to stay " "task-oriented. Has been a procrastinator in the past. This has affected her at home in completing chores and normal household duties. States that she is "totally unable to sit still." She was eventually diagnosed with ADD about 7 years ago by her PCP, and started on Adderall with good efficacy and tolerability. "It changed my life. I got so many things accomplished. I could finally complete everything that's on my list."     Has a 1 and 3 year old. Her 1 year old has been diagnosed with spina bifida and hydrocephalus, requiring lots of at-home care. Her  travels frequently with the , and "we unexpectedly had a special needs child, and I have felt totally overwhelmed and like I'm carrying the weight of the world." States that her child had 6 major surgeries before she was 6 months old - has been totally overwhelming for her. Her  was deployed during her pregnancy and when they discovered the child's diagnosis.     Denies any hx of depression. Denies anhedonia, denies feelings of of hopelessness and worthlessness. Expresses some guilt that "what happened to by daughter may be kind of my fault". Dec'd sleep quality, gets around 4-5 hours of sleep but attributes this to "teething kids." Endorses fatigue, "I feel like I'm always tired" and endorses poor concentration. Appetite good. Denies PMA. Denies SI/intent plan.     Attributes most of her anxiety to situational stressors r/t to raising her daughter. Denies generalized or excessive worrying outside of daily tasks/responsibilities. Dec'd energy and cocnentration. Denies feeling restless or tense. Does endorses some irritability.       Past Medical hx:   Past Medical History:   Diagnosis Date    ADD (attention deficit disorder)     Morbid obesity 12/4/2017    Obesity     Plantar fasciitis 5/30/2012        Interim hx:  Medication changes last visit: none  Anxiety: denies  Depression: denies     Denies suicidal/homicidal ideations.  Denies " "hopelessness/worthlessness.    Denies auditory/visual hallucinations      Tobacco: former smoker, quit x 6 years  Alcohol: "couldn't tell you the last time I had a drink"  Drug use: denies  Caffeine: 2 cups of coffee daily      Review of Systems   PSYCHIATRIC: Pertinent items are noted in the narrative.        CONSTITUTIONAL: weight stable        M/S: no pain today         ENT: no allergies noted today        ABD: no n/v/d     Past Medical, Family and Social History: The patient's past medical, family and social history have been reviewed and updated as appropriate within the electronic medical record. See encounter notes.     Medication: Adderall XR 10mg QD, lexapro 10mg qhd     Compliance: yes      Side effects: tolerates     Risk Parameters:  Patient reports no suicidal ideation  Patient reports no homicidal ideation  Patient reports no self-injurious behavior  Patient reports no violent behavior     Exam (detailed: at least 9 elements; comprehensive: all 15 elements)   Constitutional  Vitals:  Most recent vital signs, dated less than 90 days prior to this appointment, were reviewed. There were no vitals taken for this visit.     General:  unremarkable, age appropriate, casual attire, good eye contact, good rapport       Musculoskeletal  Muscle Strength/Tone:  no flaccidity, no tremor    Gait & Station:  normal      Psychiatric                       Speech:  normal tone, normal rate, rhythm, and volume   Mood & Affect:   Euthymic, congruent, appropriate         Thought Process:   Goal directed; Linear    Associations:   intact   Thought Content:   No SI/HI, delusions, or paranoia, no AV/VH   Insight & Judgement:   Good, adequate to circumstances   Orientation:   grossly intact; alert and oriented x 4    Memory:  intact for content of interview    Language:  grossly intact, can repeat    Attention Span  : Grossly intact for content of interview   Fund of Knowledge:   intact and appropriate to age and level of " "education        Assessment and Diagnosis   Status/Progress: Based on the examination today, the patient's problem(s) is/are under fair control.  New problems have not been presented today. Comorbidities are not currently complicating management of the primary condition.      Impression:      Pt is a 37 year old female with past psychiatric hx of ADD, "post partum depression", mood disorder NOS who presents for follow up treatment. She is currently taking Lexapro 20mg QD, Adderall XR 20mg QD. Meds tolerated well and provide good symptomatic relief overall. However pt did report that she self tapered to 10mg QD.     Since last visit, pt notes she's been feeling slightly more stressed than usual but adds this is circumstantial, as she recently accepted a new part time job. However, she feels much more fulfilled and notes "I feel like I found my sense of purpose again." Mood is good and denies any overt symptoms of depression today. Sleeping well, good appetite.     ADD symptoms well-managed. Pt stable, high funcitoning.       Diagnosis: ADD, mood disorder unspecified    Intervention/Counseling/Treatment Plan   Medication Management:      1. Cont Adderall XR 20mg QD. Discussed risks of abuse potential, insomnia, anxiety, elevated BP, HR, arrthymias, MI, stroke, sudden death     2. Cont Lexapro 10mg QD for mood/anxiety.  Discussed potential for GI side effects, sexual dysfunction, mood destabilization, headaches     3. Call to report any worsening of symptoms or problems with the medication. Pt instructed to go to ER with thoughts of harming self, others     4. Patient given contact # for psychotherapists at [facility] and also instructed she may check with insurance for list of providers.      5. Labs: no new orders     Return to clinic: 3 month - self      -Spent 30min face to face with the pt; >50% time spent in counseling   -Supportive therapy and psychoeducation provided  -R/B/SE's of medications discussed " with the pt who expresses understanding and chooses to take medications as prescribed.   -Pt instructed to call clinic, 911 or go to nearest emergency room if sxs worsen or pt is in   crisis. The pt expresses understanding.    Vito Brantley, NP

## 2023-04-28 RX ORDER — ESCITALOPRAM OXALATE 20 MG/1
TABLET ORAL
Qty: 30 TABLET | Refills: 1 | Status: SHIPPED | OUTPATIENT
Start: 2023-04-28 | End: 2023-07-03

## 2023-05-16 ENCOUNTER — PATIENT MESSAGE (OUTPATIENT)
Dept: PSYCHIATRY | Facility: CLINIC | Age: 38
End: 2023-05-16
Payer: COMMERCIAL

## 2023-05-16 RX ORDER — DEXTROAMPHETAMINE SACCHARATE, AMPHETAMINE ASPARTATE MONOHYDRATE, DEXTROAMPHETAMINE SULFATE AND AMPHETAMINE SULFATE 5; 5; 5; 5 MG/1; MG/1; MG/1; MG/1
20 CAPSULE, EXTENDED RELEASE ORAL EVERY MORNING
Qty: 30 CAPSULE | Refills: 0 | Status: SHIPPED | OUTPATIENT
Start: 2023-05-16 | End: 2023-06-16 | Stop reason: SDUPTHER

## 2023-06-16 RX ORDER — DEXTROAMPHETAMINE SACCHARATE, AMPHETAMINE ASPARTATE MONOHYDRATE, DEXTROAMPHETAMINE SULFATE AND AMPHETAMINE SULFATE 5; 5; 5; 5 MG/1; MG/1; MG/1; MG/1
20 CAPSULE, EXTENDED RELEASE ORAL EVERY MORNING
Qty: 30 CAPSULE | Refills: 0 | Status: SHIPPED | OUTPATIENT
Start: 2023-06-16 | End: 2023-07-21 | Stop reason: SDUPTHER

## 2023-07-03 RX ORDER — ESCITALOPRAM OXALATE 20 MG/1
TABLET ORAL
Qty: 30 TABLET | Refills: 0 | Status: SHIPPED | OUTPATIENT
Start: 2023-07-03 | End: 2023-09-05 | Stop reason: SDUPTHER

## 2023-07-03 NOTE — TELEPHONE ENCOUNTER
Last filled 4/28/23, last virtual visit 3/31/23, LMOV to schedule 3 month follow up and sent [employer] message

## 2023-07-21 ENCOUNTER — OFFICE VISIT (OUTPATIENT)
Dept: PSYCHIATRY | Facility: CLINIC | Age: 38
End: 2023-07-21
Payer: COMMERCIAL

## 2023-07-21 ENCOUNTER — PATIENT MESSAGE (OUTPATIENT)
Dept: PSYCHIATRY | Facility: CLINIC | Age: 38
End: 2023-07-21
Payer: COMMERCIAL

## 2023-07-21 DIAGNOSIS — F98.8 ATTENTION DEFICIT DISORDER, UNSPECIFIED HYPERACTIVITY PRESENCE: ICD-10-CM

## 2023-07-21 DIAGNOSIS — F39 UNSPECIFIED MOOD (AFFECTIVE) DISORDER: Primary | ICD-10-CM

## 2023-07-21 PROCEDURE — 99214 PR OFFICE/OUTPT VISIT, EST, LEVL IV, 30-39 MIN: ICD-10-PCS | Mod: 95,,, | Performed by: NURSE PRACTITIONER

## 2023-07-21 PROCEDURE — 1160F PR REVIEW ALL MEDS BY PRESCRIBER/CLIN PHARMACIST DOCUMENTED: ICD-10-PCS | Mod: CPTII,95,, | Performed by: NURSE PRACTITIONER

## 2023-07-21 PROCEDURE — 1159F MED LIST DOCD IN RCRD: CPT | Mod: CPTII,95,, | Performed by: NURSE PRACTITIONER

## 2023-07-21 PROCEDURE — 99214 OFFICE O/P EST MOD 30 MIN: CPT | Mod: 95,,, | Performed by: NURSE PRACTITIONER

## 2023-07-21 PROCEDURE — 1160F RVW MEDS BY RX/DR IN RCRD: CPT | Mod: CPTII,95,, | Performed by: NURSE PRACTITIONER

## 2023-07-21 PROCEDURE — 1159F PR MEDICATION LIST DOCUMENTED IN MEDICAL RECORD: ICD-10-PCS | Mod: CPTII,95,, | Performed by: NURSE PRACTITIONER

## 2023-07-21 NOTE — PROGRESS NOTES
"Outpatient Psychiatry Follow-Up Visit    Clinical Status of Patient: Outpatient (Virtual)  07/21/2023   [address] [place] [address]  [phone] (M)     Chief Complaint: Pt is a 36 year old female who presents today for a follow-up. Met with patient.       Interval History and Content of Current Session:  Interim Events/Subjective Report/Content of Current Session:  follow up appointment.    Pt is a 38 year old female with past psychiatric hx of ADD, "post partum depression", mood disorder NOS who presents for follow up treatment. She is currently taking Lexapro 20mg QD, Adderall XR 20mg QD. Meds tolerated well and provide good symptomatic relief overall. However pt did report that she self tapered to 10mg QD.     Since last visit, pt notes that she has been feeling increasingly overwhelmed due to several ongoing life stressors. She feels anxious, restless, and on-edge throughout the day. She does acknowledge that many of her symptoms are stress related. She has been feeling forgetful as well. Sleeping well, however. We defer med changes at this time, but pt is stable.     Past Psychiatric hx: Pt. is a 36 year old female with a past psychiatric hx of ADD who established care with me 09/19. Patient also has a past medical history of morbid obesity. She presented to me not taking any psychiatric meds but does report on previous trial of Adderall which was therapeutic and well-tolerated - d/c this upon becoming pregnant about 4 years ago. Now going back to work and feels she would like to re-start her ADD medications.      Pt speaks to a long hx of inattention and distractibility that began in childhood and prior to age 10. "I was never able to sit still in class and was always thinking about everything except what they were saying. I've always been late to everything and forgetful." States that she has struggled with poor focus, and inability to stay task-oriented. Has been a procrastinator in the " "past. This has affected her at home in completing chores and normal household duties. States that she is "totally unable to sit still." She was eventually diagnosed with ADD about 7 years ago by her PCP, and started on Adderall with good efficacy and tolerability. "It changed my life. I got so many things accomplished. I could finally complete everything that's on my list."     Has a 1 and 3 year old. Her 1 year old has been diagnosed with spina bifida and hydrocephalus, requiring lots of at-home care. Her  travels frequently with the , and "we unexpectedly had a special needs child, and I have felt totally overwhelmed and like I'm carrying the weight of the world." States that her child had 6 major surgeries before she was 6 months old - has been totally overwhelming for her. Her  was deployed during her pregnancy and when they discovered the child's diagnosis.     Denies any hx of depression. Denies anhedonia, denies feelings of of hopelessness and worthlessness. Expresses some guilt that "what happened to by daughter may be kind of my fault". Dec'd sleep quality, gets around 4-5 hours of sleep but attributes this to "teething kids." Endorses fatigue, "I feel like I'm always tired" and endorses poor concentration. Appetite good. Denies PMA. Denies SI/intent plan.     Attributes most of her anxiety to situational stressors r/t to raising her daughter. Denies generalized or excessive worrying outside of daily tasks/responsibilities. Dec'd energy and cocnentration. Denies feeling restless or tense. Does endorses some irritability.       Past Medical hx:   Past Medical History:   Diagnosis Date    ADD (attention deficit disorder)     Morbid obesity 12/4/2017    Obesity     Plantar fasciitis 5/30/2012        Interim hx:  Medication changes last visit: none  Anxiety: denies  Depression: denies     Denies suicidal/homicidal ideations.  Denies hopelessness/worthlessness.    Denies auditory/visual " "hallucinations      Tobacco: former smoker, quit x 6 years  Alcohol: "couldn't tell you the last time I had a drink"  Drug use: denies  Caffeine: 2 cups of coffee daily      Review of Systems   PSYCHIATRIC: Pertinent items are noted in the narrative.        CONSTITUTIONAL: weight stable        M/S: no pain today         ENT: no allergies noted today        ABD: no n/v/d     Past Medical, Family and Social History: The patient's past medical, family and social history have been reviewed and updated as appropriate within the electronic medical record. See encounter notes.     Medication: Adderall XR 10mg QD, lexapro 10mg qhd     Compliance: yes      Side effects: tolerates     Risk Parameters:  Patient reports no suicidal ideation  Patient reports no homicidal ideation  Patient reports no self-injurious behavior  Patient reports no violent behavior     Exam (detailed: at least 9 elements; comprehensive: all 15 elements)   Constitutional  Vitals:  Most recent vital signs, dated less than 90 days prior to this appointment, were reviewed. There were no vitals taken for this visit.     General:  unremarkable, age appropriate, casual attire, good eye contact, good rapport       Musculoskeletal  Muscle Strength/Tone:  no flaccidity, no tremor    Gait & Station:  normal      Psychiatric                       Speech:  normal tone, normal rate, rhythm, and volume   Mood & Affect:   Euthymic, congruent, appropriate         Thought Process:   Goal directed; Linear    Associations:   intact   Thought Content:   No SI/HI, delusions, or paranoia, no AV/VH   Insight & Judgement:   Good, adequate to circumstances   Orientation:   grossly intact; alert and oriented x 4    Memory:  intact for content of interview    Language:  grossly intact, can repeat    Attention Span  : Grossly intact for content of interview   Fund of Knowledge:   intact and appropriate to age and level of education        Assessment and Diagnosis " "  Status/Progress: Based on the examination today, the patient's problem(s) is/are under fair control.  New problems have not been presented today. Comorbidities are not currently complicating management of the primary condition.      Impression:          Pt is a 38 year old female with past psychiatric hx of ADD, "post partum depression", mood disorder NOS who presents for follow up treatment. She is currently taking Lexapro 20mg QD, Adderall XR 20mg QD. Meds tolerated well and provide good symptomatic relief overall. However pt did report that she self tapered to 10mg QD.     Since last visit, pt notes that she has been feeling increasingly overwhelmed due to several ongoing life stressors. She feels anxious, restless, and on-edge throughout the day. She does acknowledge that many of her symptoms are stress related. She has been feeling forgetful as well. Sleeping well, however. We defer med changes at this time, but pt is stable.   Diagnosis: ADD, mood disorder unspecified    Intervention/Counseling/Treatment Plan   Medication Management:      1. Cont Adderall XR 20mg QD. Discussed risks of abuse potential, insomnia, anxiety, elevated BP, HR, arrthymias, MI, stroke, sudden death     2. Cont Lexapro 20mg QD for mood/anxiety.  Discussed potential for GI side effects, sexual dysfunction, mood destabilization, headaches     3. Call to report any worsening of symptoms or problems with the medication. Pt instructed to go to ER with thoughts of harming self, others     4. Patient given contact # for psychotherapists at [facility] and also instructed she may check with insurance for list of providers.      5. Labs: no new orders     Return to clinic: 3 month - self      -Spent 30min face to face with the pt; >50% time spent in counseling   -Supportive therapy and psychoeducation provided  -R/B/SE's of medications discussed with the pt who expresses understanding and chooses to take medications as prescribed.   -Pt " instructed to call clinic, 911 or go to nearest emergency room if sxs worsen or pt is in   crisis. The pt expresses understanding.    Vito Brantley, NP

## 2023-08-22 RX ORDER — DEXTROAMPHETAMINE SACCHARATE, AMPHETAMINE ASPARTATE MONOHYDRATE, DEXTROAMPHETAMINE SULFATE AND AMPHETAMINE SULFATE 5; 5; 5; 5 MG/1; MG/1; MG/1; MG/1
20 CAPSULE, EXTENDED RELEASE ORAL EVERY MORNING
Qty: 30 CAPSULE | Refills: 0 | Status: SHIPPED | OUTPATIENT
Start: 2023-08-22 | End: 2023-09-19 | Stop reason: SDUPTHER

## 2023-09-05 RX ORDER — BUPROPION HYDROCHLORIDE 150 MG/1
150 TABLET ORAL DAILY
Qty: 30 TABLET | Refills: 2 | Status: SHIPPED | OUTPATIENT
Start: 2023-09-05 | End: 2023-11-27

## 2023-09-05 RX ORDER — ESCITALOPRAM OXALATE 20 MG/1
20 TABLET ORAL DAILY
Qty: 30 TABLET | Refills: 3 | Status: SHIPPED | OUTPATIENT
Start: 2023-09-05 | End: 2023-12-11

## 2023-09-20 ENCOUNTER — TELEPHONE (OUTPATIENT)
Dept: PSYCHIATRY | Facility: CLINIC | Age: 38
End: 2023-09-20
Payer: COMMERCIAL

## 2023-09-20 RX ORDER — DEXTROAMPHETAMINE SACCHARATE, AMPHETAMINE ASPARTATE MONOHYDRATE, DEXTROAMPHETAMINE SULFATE AND AMPHETAMINE SULFATE 5; 5; 5; 5 MG/1; MG/1; MG/1; MG/1
20 CAPSULE, EXTENDED RELEASE ORAL EVERY MORNING
Qty: 30 CAPSULE | Refills: 0 | Status: SHIPPED | OUTPATIENT
Start: 2023-09-20 | End: 2023-10-22 | Stop reason: SDUPTHER

## 2023-09-20 NOTE — TELEPHONE ENCOUNTER
For documentation purposes        Prior Authorization for Amphetamine-Dextroamphet ER 20 mg Approved from 08/21/2023 to 09/19/2024      Pharmacy and patient notified.  Love

## 2023-10-23 RX ORDER — DEXTROAMPHETAMINE SACCHARATE, AMPHETAMINE ASPARTATE MONOHYDRATE, DEXTROAMPHETAMINE SULFATE AND AMPHETAMINE SULFATE 5; 5; 5; 5 MG/1; MG/1; MG/1; MG/1
20 CAPSULE, EXTENDED RELEASE ORAL EVERY MORNING
Qty: 30 CAPSULE | Refills: 0 | Status: SHIPPED | OUTPATIENT
Start: 2023-10-23 | End: 2023-11-27 | Stop reason: SDUPTHER

## 2023-11-20 ENCOUNTER — PATIENT MESSAGE (OUTPATIENT)
Dept: PSYCHIATRY | Facility: CLINIC | Age: 38
End: 2023-11-20
Payer: COMMERCIAL

## 2023-11-22 ENCOUNTER — TELEPHONE (OUTPATIENT)
Dept: PSYCHIATRY | Facility: CLINIC | Age: 38
End: 2023-11-22
Payer: COMMERCIAL

## 2023-11-22 ENCOUNTER — PATIENT MESSAGE (OUTPATIENT)
Dept: PSYCHIATRY | Facility: CLINIC | Age: 38
End: 2023-11-22
Payer: COMMERCIAL

## 2023-11-22 NOTE — TELEPHONE ENCOUNTER
Called and left a voicemail to see if the appointment Mallory Morrison scheduled for Monday 11/27/23 at 11:30 in person with Vito Brantley works for the patient

## 2023-11-24 ENCOUNTER — PATIENT MESSAGE (OUTPATIENT)
Dept: PSYCHIATRY | Facility: CLINIC | Age: 38
End: 2023-11-24
Payer: COMMERCIAL

## 2023-11-27 ENCOUNTER — OFFICE VISIT (OUTPATIENT)
Dept: PSYCHIATRY | Facility: CLINIC | Age: 38
End: 2023-11-27
Payer: COMMERCIAL

## 2023-11-27 VITALS
BODY MASS INDEX: 46.75 KG/M2 | DIASTOLIC BLOOD PRESSURE: 82 MMHG | WEIGHT: 280.63 LBS | SYSTOLIC BLOOD PRESSURE: 134 MMHG | HEART RATE: 78 BPM | HEIGHT: 65 IN

## 2023-11-27 DIAGNOSIS — F39 UNSPECIFIED MOOD (AFFECTIVE) DISORDER: Primary | ICD-10-CM

## 2023-11-27 DIAGNOSIS — F98.8 ATTENTION DEFICIT DISORDER, UNSPECIFIED HYPERACTIVITY PRESENCE: ICD-10-CM

## 2023-11-27 PROCEDURE — 99214 OFFICE O/P EST MOD 30 MIN: CPT | Mod: S$GLB,,, | Performed by: NURSE PRACTITIONER

## 2023-11-27 PROCEDURE — 3079F PR MOST RECENT DIASTOLIC BLOOD PRESSURE 80-89 MM HG: ICD-10-PCS | Mod: CPTII,S$GLB,, | Performed by: NURSE PRACTITIONER

## 2023-11-27 PROCEDURE — 3075F SYST BP GE 130 - 139MM HG: CPT | Mod: CPTII,S$GLB,, | Performed by: NURSE PRACTITIONER

## 2023-11-27 PROCEDURE — 1160F PR REVIEW ALL MEDS BY PRESCRIBER/CLIN PHARMACIST DOCUMENTED: ICD-10-PCS | Mod: CPTII,S$GLB,, | Performed by: NURSE PRACTITIONER

## 2023-11-27 PROCEDURE — 99999 PR PBB SHADOW E&M-EST. PATIENT-LVL III: ICD-10-PCS | Mod: PBBFAC,,, | Performed by: NURSE PRACTITIONER

## 2023-11-27 PROCEDURE — 3008F BODY MASS INDEX DOCD: CPT | Mod: CPTII,S$GLB,, | Performed by: NURSE PRACTITIONER

## 2023-11-27 PROCEDURE — 99214 PR OFFICE/OUTPT VISIT, EST, LEVL IV, 30-39 MIN: ICD-10-PCS | Mod: S$GLB,,, | Performed by: NURSE PRACTITIONER

## 2023-11-27 PROCEDURE — 99999 PR PBB SHADOW E&M-EST. PATIENT-LVL III: CPT | Mod: PBBFAC,,, | Performed by: NURSE PRACTITIONER

## 2023-11-27 PROCEDURE — 3075F PR MOST RECENT SYSTOLIC BLOOD PRESS GE 130-139MM HG: ICD-10-PCS | Mod: CPTII,S$GLB,, | Performed by: NURSE PRACTITIONER

## 2023-11-27 PROCEDURE — 1159F MED LIST DOCD IN RCRD: CPT | Mod: CPTII,S$GLB,, | Performed by: NURSE PRACTITIONER

## 2023-11-27 PROCEDURE — 3008F PR BODY MASS INDEX (BMI) DOCUMENTED: ICD-10-PCS | Mod: CPTII,S$GLB,, | Performed by: NURSE PRACTITIONER

## 2023-11-27 PROCEDURE — 3079F DIAST BP 80-89 MM HG: CPT | Mod: CPTII,S$GLB,, | Performed by: NURSE PRACTITIONER

## 2023-11-27 PROCEDURE — 1160F RVW MEDS BY RX/DR IN RCRD: CPT | Mod: CPTII,S$GLB,, | Performed by: NURSE PRACTITIONER

## 2023-11-27 PROCEDURE — 1159F PR MEDICATION LIST DOCUMENTED IN MEDICAL RECORD: ICD-10-PCS | Mod: CPTII,S$GLB,, | Performed by: NURSE PRACTITIONER

## 2023-11-27 RX ORDER — EPINEPHRINE 0.3 MG/.3ML
INJECTION SUBCUTANEOUS
COMMUNITY
Start: 2023-07-12

## 2023-11-27 RX ORDER — DEXTROAMPHETAMINE SACCHARATE, AMPHETAMINE ASPARTATE MONOHYDRATE, DEXTROAMPHETAMINE SULFATE AND AMPHETAMINE SULFATE 6.25; 6.25; 6.25; 6.25 MG/1; MG/1; MG/1; MG/1
25 CAPSULE, EXTENDED RELEASE ORAL EVERY MORNING
Qty: 30 CAPSULE | Refills: 0 | Status: SHIPPED | OUTPATIENT
Start: 2023-11-27 | End: 2023-12-27 | Stop reason: SDUPTHER

## 2023-11-27 NOTE — PROGRESS NOTES
"Outpatient Psychiatry Follow-Up Visit    Clinical Status of Patient: Outpatient (Ambulatory)  11/27/2023     Chief Complaint: Pt is a 36 year old female who presents today for a follow-up. Met with patient.       Interval History and Content of Current Session:  Interim Events/Subjective Report/Content of Current Session:  follow up appointment.    Pt is a 38 year old female with past psychiatric hx of ADD, "post partum depression", mood disorder NOS who presents for follow up treatment. She is currently taking Lexapro 20mg QD, Adderall XR 20mg QD. Meds tolerated well and provide good symptomatic relief overall. Pt notes that her mood has been good but does still feel easily overwhelmed and anxious due to her daughter's poor mental health.  She is not overtly depressed today and denies feeling hopeless or anhedonic. She denies any true generalized worrying and feels her symptoms are stress-related. ADD symptoms are fairly well-managed with current dose of Adderall XR but does struggle with meeting goals and organizing her to-do lists. This has had a negative impact on her functioning. Pt reports that she has been losing sleep over the stress and does not engage in any cardiovascular activity. She has been counseled lifestyle changes.       Past Psychiatric hx: Pt. is a 36 year old female with a past psychiatric hx of ADD who established care with me 09/19. Patient also has a past medical history of morbid obesity. She presented to me not taking any psychiatric meds but does report on previous trial of Adderall which was therapeutic and well-tolerated - d/c this upon becoming pregnant about 4 years ago. Now going back to work and feels she would like to re-start her ADD medications.      Pt speaks to a long hx of inattention and distractibility that began in childhood and prior to age 10. "I was never able to sit still in class and was always thinking about everything except what they were saying. I've always been " "late to everything and forgetful." States that she has struggled with poor focus, and inability to stay task-oriented. Has been a procrastinator in the past. This has affected her at home in completing chores and normal household duties. States that she is "totally unable to sit still." She was eventually diagnosed with ADD about 7 years ago by her PCP, and started on Adderall with good efficacy and tolerability. "It changed my life. I got so many things accomplished. I could finally complete everything that's on my list."     Has a 1 and 3 year old. Her 1 year old has been diagnosed with spina bifida and hydrocephalus, requiring lots of at-home care. Her  travels frequently with the , and "we unexpectedly had a special needs child, and I have felt totally overwhelmed and like I'm carrying the weight of the world." States that her child had 6 major surgeries before she was 6 months old - has been totally overwhelming for her. Her  was deployed during her pregnancy and when they discovered the child's diagnosis.     Denies any hx of depression. Denies anhedonia, denies feelings of of hopelessness and worthlessness. Expresses some guilt that "what happened to by daughter may be kind of my fault". Dec'd sleep quality, gets around 4-5 hours of sleep but attributes this to "teething kids." Endorses fatigue, "I feel like I'm always tired" and endorses poor concentration. Appetite good. Denies PMA. Denies SI/intent plan.     Attributes most of her anxiety to situational stressors r/t to raising her daughter. Denies generalized or excessive worrying outside of daily tasks/responsibilities. Dec'd energy and cocnentration. Denies feeling restless or tense. Does endorses some irritability.       Past Medical hx:   Past Medical History:   Diagnosis Date    ADD (attention deficit disorder)     Morbid obesity 12/4/2017    Obesity     Plantar fasciitis 5/30/2012        Interim hx:  Medication changes last " "visit: none  Anxiety: denies  Depression: denies     Denies suicidal/homicidal ideations.  Denies hopelessness/worthlessness.    Denies auditory/visual hallucinations      Tobacco: former smoker, quit x 6 years  Alcohol: "couldn't tell you the last time I had a drink"  Drug use: denies  Caffeine: 2 cups of coffee daily      Review of Systems   PSYCHIATRIC: Pertinent items are noted in the narrative.        CONSTITUTIONAL: weight stable        M/S: no pain today         ENT: no allergies noted today        ABD: no n/v/d     Past Medical, Family and Social History: The patient's past medical, family and social history have been reviewed and updated as appropriate within the electronic medical record. See encounter notes.     Medication: Adderall XR 10mg QD, lexapro 10mg qhd     Compliance: yes      Side effects: tolerates     Risk Parameters:  Patient reports no suicidal ideation  Patient reports no homicidal ideation  Patient reports no self-injurious behavior  Patient reports no violent behavior     Exam (detailed: at least 9 elements; comprehensive: all 15 elements)   Constitutional  Vitals:  Most recent vital signs, dated less than 90 days prior to this appointment, were reviewed. There were no vitals taken for this visit.     General:  unremarkable, age appropriate, casual attire, good eye contact, good rapport       Musculoskeletal  Muscle Strength/Tone:  no flaccidity, no tremor    Gait & Station:  normal      Psychiatric                       Speech:  normal tone, normal rate, rhythm, and volume   Mood & Affect:   Euthymic, congruent, appropriate         Thought Process:   Goal directed; Linear    Associations:   intact   Thought Content:   No SI/HI, delusions, or paranoia, no AV/VH   Insight & Judgement:   Good, adequate to circumstances   Orientation:   grossly intact; alert and oriented x 4    Memory:  intact for content of interview    Language:  grossly intact, can repeat    Attention Span  : Grossly " "intact for content of interview   Fund of Knowledge:   intact and appropriate to age and level of education        Assessment and Diagnosis   Status/Progress: Based on the examination today, the patient's problem(s) is/are under fair control.  New problems have not been presented today. Comorbidities are not currently complicating management of the primary condition.      Impression:        Pt is a 38 year old female with past psychiatric hx of ADD, "post partum depression", mood disorder NOS who presents for follow up treatment. She is currently taking Lexapro 20mg QD, Adderall XR 20mg QD. Meds tolerated well and provide good symptomatic relief overall. Pt notes that her mood has been good but does still feel easily overwhelmed and anxious due to her daughter's poor mental health.  She is not overtly depressed today and denies feeling hopeless or anhedonic. She denies any true generalized worrying and feels her symptoms are stress-related. ADD symptoms are fairly well-managed with current dose of Adderall XR but does struggle with meeting goals and organizing her to-do lists. This has had a negative impact on her functioning. Pt reports that she has been losing sleep over the stress and does not engage in any cardiovascular activity. She has been counseled lifestyle changes.     Diagnosis: ADD, mood disorder unspecified    Intervention/Counseling/Treatment Plan   Medication Management:      1. Inc Adderall XR 25mg QD. Discussed risks of abuse potential, insomnia, anxiety, elevated BP, HR, arrthymias, MI, stroke, sudden death     2. Cont Lexapro 20mg QD for mood/anxiety.  Discussed potential for GI side effects, sexual dysfunction, mood destabilization, headaches     3. Call to report any worsening of symptoms or problems with the medication. Pt instructed to go to ER with thoughts of harming self, others     4. Patient given contact # for psychotherapists at [facility] and also instructed she may check with " insurance for list of providers.      5. Labs: no new orders     Return to clinic: 3 month - self      -Spent 30min face to face with the pt; >50% time spent in counseling   -Supportive therapy and psychoeducation provided  -R/B/SE's of medications discussed with the pt who expresses understanding and chooses to take medications as prescribed.   -Pt instructed to call clinic, 911 or go to nearest emergency room if sxs worsen or pt is in   crisis. The pt expresses understanding.    Vito Brantley, NP

## 2023-12-11 RX ORDER — ESCITALOPRAM OXALATE 20 MG/1
20 TABLET ORAL
Qty: 30 TABLET | Refills: 3 | Status: SHIPPED | OUTPATIENT
Start: 2023-12-11

## 2023-12-11 NOTE — TELEPHONE ENCOUNTER
Last ordered: 3 months ago (9/5/2023) by Vito Brantley NP     Last refill: 12/11/2023     NOV 2/27/24

## 2023-12-13 ENCOUNTER — TELEPHONE (OUTPATIENT)
Dept: ORTHOPEDICS | Facility: CLINIC | Age: 38
End: 2023-12-13
Payer: COMMERCIAL

## 2023-12-13 DIAGNOSIS — M79.671 RIGHT FOOT PAIN: Primary | ICD-10-CM

## 2023-12-27 RX ORDER — DEXTROAMPHETAMINE SACCHARATE, AMPHETAMINE ASPARTATE MONOHYDRATE, DEXTROAMPHETAMINE SULFATE AND AMPHETAMINE SULFATE 6.25; 6.25; 6.25; 6.25 MG/1; MG/1; MG/1; MG/1
25 CAPSULE, EXTENDED RELEASE ORAL EVERY MORNING
Qty: 30 CAPSULE | Refills: 0 | Status: SHIPPED | OUTPATIENT
Start: 2023-12-27 | End: 2024-01-26 | Stop reason: SDUPTHER

## 2024-01-26 RX ORDER — DEXTROAMPHETAMINE SACCHARATE, AMPHETAMINE ASPARTATE MONOHYDRATE, DEXTROAMPHETAMINE SULFATE AND AMPHETAMINE SULFATE 6.25; 6.25; 6.25; 6.25 MG/1; MG/1; MG/1; MG/1
25 CAPSULE, EXTENDED RELEASE ORAL EVERY MORNING
Qty: 30 CAPSULE | Refills: 0 | Status: SHIPPED | OUTPATIENT
Start: 2024-01-26 | End: 2024-02-27 | Stop reason: SDUPTHER

## 2024-02-27 ENCOUNTER — OFFICE VISIT (OUTPATIENT)
Dept: PSYCHIATRY | Facility: CLINIC | Age: 39
End: 2024-02-27
Payer: COMMERCIAL

## 2024-02-27 DIAGNOSIS — F39 UNSPECIFIED MOOD (AFFECTIVE) DISORDER: Primary | ICD-10-CM

## 2024-02-27 DIAGNOSIS — F98.8 ATTENTION DEFICIT DISORDER, UNSPECIFIED HYPERACTIVITY PRESENCE: ICD-10-CM

## 2024-02-27 PROCEDURE — 99214 OFFICE O/P EST MOD 30 MIN: CPT | Mod: 95,,, | Performed by: NURSE PRACTITIONER

## 2024-02-27 PROCEDURE — 1160F RVW MEDS BY RX/DR IN RCRD: CPT | Mod: CPTII,95,, | Performed by: NURSE PRACTITIONER

## 2024-02-27 PROCEDURE — 1159F MED LIST DOCD IN RCRD: CPT | Mod: CPTII,95,, | Performed by: NURSE PRACTITIONER

## 2024-02-27 RX ORDER — DEXTROAMPHETAMINE SACCHARATE, AMPHETAMINE ASPARTATE MONOHYDRATE, DEXTROAMPHETAMINE SULFATE AND AMPHETAMINE SULFATE 6.25; 6.25; 6.25; 6.25 MG/1; MG/1; MG/1; MG/1
25 CAPSULE, EXTENDED RELEASE ORAL EVERY MORNING
Qty: 30 CAPSULE | Refills: 0 | Status: SHIPPED | OUTPATIENT
Start: 2024-02-27 | End: 2024-03-20 | Stop reason: SDUPTHER

## 2024-02-27 NOTE — PROGRESS NOTES
"Outpatient Psychiatry Follow-Up Visit    Clinical Status of Patient: Outpatient (Virtual)  02/27/2024       [id number] [address] [place] [id number]  [phone] (M)  [phone] (H)  Visit type: Virtual visit with synchronous audio and video  Each patient to whom he or she provides medical services by telemedicine is:  (1) informed of the relationship between the physician and patient and the respective role of any other health care provider with respect to management of the patient; and (2) notified that he or she may decline to receive medical services by telemedicine and may withdraw from such care at any time.    Chief Complaint: Pt is a 39 year old female who presents today for a follow-up. Met with patient.       Interval History and Content of Current Session:  Interim Events/Subjective Report/Content of Current Session:  follow up appointment.    Pt is a 39 year old female with past psychiatric hx of ADD, "post partum depression", mood disorder NOS who presents for follow up treatment. She is currently taking Lexapro 20mg QD, and increased Adderall XR to 25mg QD at her last visit. Today, pt notes she is doing well. She notes minimal anxiety or depression, and feels she is in a good place. Denies anhedonia, apathy, or hopelessness. Denies excessive or ruminative worrying. ADD symptoms responding well to increased Adderall XR dose. She is sleeping well with a normal appetite. Pt stable.         Past Psychiatric hx: Pt. is a 36 year old female with a past psychiatric hx of ADD who established care with me 09/19. Patient also has a past medical history of morbid obesity. She presented to me not taking any psychiatric meds but does report on previous trial of Adderall which was therapeutic and well-tolerated - d/c this upon becoming pregnant about 4 years ago. Now going back to work and feels she would like to re-start her ADD medications.      Pt speaks to a long hx of inattention and distractibility that began " "in childhood and prior to age 10. "I was never able to sit still in class and was always thinking about everything except what they were saying. I've always been late to everything and forgetful." States that she has struggled with poor focus, and inability to stay task-oriented. Has been a procrastinator in the past. This has affected her at home in completing chores and normal household duties. States that she is "totally unable to sit still." She was eventually diagnosed with ADD about 7 years ago by her PCP, and started on Adderall with good efficacy and tolerability. "It changed my life. I got so many things accomplished. I could finally complete everything that's on my list."     Has a 1 and 3 year old. Her 1 year old has been diagnosed with spina bifida and hydrocephalus, requiring lots of at-home care. Her  travels frequently with the , and "we unexpectedly had a special needs child, and I have felt totally overwhelmed and like I'm carrying the weight of the world." States that her child had 6 major surgeries before she was 6 months old - has been totally overwhelming for her. Her  was deployed during her pregnancy and when they discovered the child's diagnosis.     Denies any hx of depression. Denies anhedonia, denies feelings of of hopelessness and worthlessness. Expresses some guilt that "what happened to by daughter may be kind of my fault". Dec'd sleep quality, gets around 4-5 hours of sleep but attributes this to "teething kids." Endorses fatigue, "I feel like I'm always tired" and endorses poor concentration. Appetite good. Denies PMA. Denies SI/intent plan.     Attributes most of her anxiety to situational stressors r/t to raising her daughter. Denies generalized or excessive worrying outside of daily tasks/responsibilities. Dec'd energy and cocnentration. Denies feeling restless or tense. Does endorses some irritability.       Past Medical hx:   Past Medical History: " "  Diagnosis Date    ADD (attention deficit disorder)     Morbid obesity 12/4/2017    Obesity     Plantar fasciitis 5/30/2012        Interim hx:  Medication changes last visit: none  Anxiety: denies  Depression: denies     Denies suicidal/homicidal ideations.  Denies hopelessness/worthlessness.    Denies auditory/visual hallucinations      Tobacco: former smoker, quit x 6 years  Alcohol: "couldn't tell you the last time I had a drink"  Drug use: denies  Caffeine: 2 cups of coffee daily      Review of Systems   PSYCHIATRIC: Pertinent items are noted in the narrative.        CONSTITUTIONAL: weight stable        M/S: no pain today         ENT: no allergies noted today        ABD: no n/v/d     Past Medical, Family and Social History: The patient's past medical, family and social history have been reviewed and updated as appropriate within the electronic medical record. See encounter notes.     Medication: Adderall XR 10mg QD, lexapro 10mg qhd     Compliance: yes      Side effects: tolerates     Risk Parameters:  Patient reports no suicidal ideation  Patient reports no homicidal ideation  Patient reports no self-injurious behavior  Patient reports no violent behavior     Exam (detailed: at least 9 elements; comprehensive: all 15 elements)   Constitutional  Vitals:  Most recent vital signs, dated less than 90 days prior to this appointment, were reviewed. There were no vitals taken for this visit.     General:  unremarkable, age appropriate, casual attire, good eye contact, good rapport       Musculoskeletal  Muscle Strength/Tone:  no flaccidity, no tremor    Gait & Station:  normal      Psychiatric                       Speech:  normal tone, normal rate, rhythm, and volume   Mood & Affect:   Euthymic, congruent, appropriate         Thought Process:   Goal directed; Linear    Associations:   intact   Thought Content:   No SI/HI, delusions, or paranoia, no AV/VH   Insight & Judgement:   Good, adequate to circumstances " "  Orientation:   grossly intact; alert and oriented x 4    Memory:  intact for content of interview    Language:  grossly intact, can repeat    Attention Span  : Grossly intact for content of interview   Fund of Knowledge:   intact and appropriate to age and level of education        Assessment and Diagnosis   Status/Progress: Based on the examination today, the patient's problem(s) is/are under fair control.  New problems have not been presented today. Comorbidities are not currently complicating management of the primary condition.      Impression:      Pt is a 39 year old female with past psychiatric hx of ADD, "post partum depression", mood disorder NOS who presents for follow up treatment. She is currently taking Lexapro 20mg QD, and increased Adderall XR to 25mg QD at her last visit. Today, pt notes she is doing well. She notes minimal anxiety or depression, and feels she is in a good place. Denies anhedonia, apathy, or hopelessness. Denies excessive or ruminative worrying. ADD symptoms responding well to increased Adderall XR dose. She is sleeping well with a normal appetite. Pt stable.     Diagnosis: ADD, mood disorder unspecified    Intervention/Counseling/Treatment Plan   Medication Management:      1. Comnt Adderall XR 25mg QD. Discussed risks of abuse potential, insomnia, anxiety, elevated BP, HR, arrthymias, MI, stroke, sudden death     2. Cont Lexapro 20mg QD for mood/anxiety.  Discussed potential for GI side effects, sexual dysfunction, mood destabilization, headaches     3. Call to report any worsening of symptoms or problems with the medication. Pt instructed to go to ER with thoughts of harming self, others     4. Patient given contact # for psychotherapists at [facility] and also instructed she may check with insurance for list of providers.      5. Labs: no new orders     Return to clinic: 3 month - self      -Spent 30min face to face with the pt; >50% time spent in counseling "   -Supportive therapy and psychoeducation provided  -R/B/SE's of medications discussed with the pt who expresses understanding and chooses to take medications as prescribed.   -Pt instructed to call clinic, 911 or go to nearest emergency room if sxs worsen or pt is in   crisis. The pt expresses understanding.    Vito Brantley, NP

## 2024-03-20 RX ORDER — DEXTROAMPHETAMINE SACCHARATE, AMPHETAMINE ASPARTATE MONOHYDRATE, DEXTROAMPHETAMINE SULFATE AND AMPHETAMINE SULFATE 6.25; 6.25; 6.25; 6.25 MG/1; MG/1; MG/1; MG/1
25 CAPSULE, EXTENDED RELEASE ORAL EVERY MORNING
Qty: 30 CAPSULE | Refills: 0 | Status: SHIPPED | OUTPATIENT
Start: 2024-03-20 | End: 2024-04-24 | Stop reason: SDUPTHER

## 2024-03-20 NOTE — TELEPHONE ENCOUNTER
Last ordered: 3 weeks ago (2/27/2024) by Vito Brantley, NP     Patient comment: I know its a few days away but im just awndjng while its in my mind       Nov 5/23/24

## 2024-04-18 RX ORDER — ESCITALOPRAM OXALATE 20 MG/1
20 TABLET ORAL
Qty: 30 TABLET | Refills: 3 | Status: SHIPPED | OUTPATIENT
Start: 2024-04-18

## 2024-04-18 NOTE — TELEPHONE ENCOUNTER
Last ordered: 4 months ago (12/11/2023) by Vito Brantley NP     Last refill: 4/18/2024       Nov 5/23/24

## 2024-04-26 RX ORDER — DEXTROAMPHETAMINE SACCHARATE, AMPHETAMINE ASPARTATE MONOHYDRATE, DEXTROAMPHETAMINE SULFATE AND AMPHETAMINE SULFATE 6.25; 6.25; 6.25; 6.25 MG/1; MG/1; MG/1; MG/1
25 CAPSULE, EXTENDED RELEASE ORAL EVERY MORNING
Qty: 30 CAPSULE | Refills: 0 | Status: SHIPPED | OUTPATIENT
Start: 2024-04-26 | End: 2024-05-27 | Stop reason: SDUPTHER

## 2024-05-03 NOTE — TELEPHONE ENCOUNTER
Patient will be coming in for lab, overdue [employer] maintenance shows lipid due, will you order, please advise.   Admission

## 2024-05-21 ENCOUNTER — PATIENT MESSAGE (OUTPATIENT)
Dept: PSYCHIATRY | Facility: CLINIC | Age: 39
End: 2024-05-21
Payer: COMMERCIAL

## 2024-05-23 ENCOUNTER — OFFICE VISIT (OUTPATIENT)
Dept: PSYCHIATRY | Facility: CLINIC | Age: 39
End: 2024-05-23
Payer: COMMERCIAL

## 2024-05-23 DIAGNOSIS — F98.8 ATTENTION DEFICIT DISORDER, UNSPECIFIED HYPERACTIVITY PRESENCE: ICD-10-CM

## 2024-05-23 DIAGNOSIS — F39 UNSPECIFIED MOOD (AFFECTIVE) DISORDER: Primary | ICD-10-CM

## 2024-05-23 PROCEDURE — 99214 OFFICE O/P EST MOD 30 MIN: CPT | Mod: 95,,, | Performed by: NURSE PRACTITIONER

## 2024-05-23 PROCEDURE — 1160F RVW MEDS BY RX/DR IN RCRD: CPT | Mod: CPTII,95,, | Performed by: NURSE PRACTITIONER

## 2024-05-23 PROCEDURE — 1159F MED LIST DOCD IN RCRD: CPT | Mod: CPTII,95,, | Performed by: NURSE PRACTITIONER

## 2024-05-23 NOTE — PROGRESS NOTES
"Outpatient Psychiatry Follow-Up Visit    Clinical Status of Patient: Outpatient (Virtual)  05/23/2024       [address] [place] [address]  [phone] (M)  [phone] (H)  Visit type: Virtual visit with synchronous audio and video  Each patient to whom he or she provides medical services by telemedicine is:  (1) informed of the relationship between the physician and patient and the respective role of any other health care provider with respect to management of the patient; and (2) notified that he or she may decline to receive medical services by telemedicine and may withdraw from such care at any time.    Chief Complaint: Pt is a 39 year old female who presents today for a follow-up. Met with patient.       Interval History and Content of Current Session:  Interim Events/Subjective Report/Content of Current Session:  follow up appointment.    Pt is a 39 year old female with past psychiatric hx of ADD, "post partum depression", mood disorder NOS who presents for follow up treatment. She is currently taking Lexapro 20mg QD, and  Adderall XR to 25mg QD.    Between visits, pt reports that their depression has been well-controlled. There are no decompensations noted or reported since their last session with me. Pt reports a good mood and denies anhedonia, hopelessness, or worthlessness. Reports good level of motivation and denies apathy or psychomotor slowing. Pt notes a good appetite, energy levels, and good quality of sleep. They are stable and high functioning.     Pt reports that their symptoms of ADD/ADHD are responding well to the current treatment plan. There is minimal distractibility and adequate level of focus. Able to function highly in most settings. Pt reports good ability to meet deadlines and accomplish tasks. They are sleeping well and report a normal appetite.         Past Psychiatric hx: Pt. is a 36 year old female with a past psychiatric hx of ADD who established care with me 09/19. Patient " "also has a past medical history of morbid obesity. She presented to me not taking any psychiatric meds but does report on previous trial of Adderall which was therapeutic and well-tolerated - d/c this upon becoming pregnant about 4 years ago. Now going back to work and feels she would like to re-start her ADD medications.      Pt speaks to a long hx of inattention and distractibility that began in childhood and prior to age 10. "I was never able to sit still in class and was always thinking about everything except what they were saying. I've always been late to everything and forgetful." States that she has struggled with poor focus, and inability to stay task-oriented. Has been a procrastinator in the past. This has affected her at home in completing chores and normal household duties. States that she is "totally unable to sit still." She was eventually diagnosed with ADD about 7 years ago by her PCP, and started on Adderall with good efficacy and tolerability. "It changed my life. I got so many things accomplished. I could finally complete everything that's on my list."     Has a 1 and 3 year old. Her 1 year old has been diagnosed with spina bifida and hydrocephalus, requiring lots of at-home care. Her  travels frequently with the , and "we unexpectedly had a special needs child, and I have felt totally overwhelmed and like I'm carrying the weight of the world." States that her child had 6 major surgeries before she was 6 months old - has been totally overwhelming for her. Her  was deployed during her pregnancy and when they discovered the child's diagnosis.     Denies any hx of depression. Denies anhedonia, denies feelings of of hopelessness and worthlessness. Expresses some guilt that "what happened to by daughter may be kind of my fault". Dec'd sleep quality, gets around 4-5 hours of sleep but attributes this to "teething kids." Endorses fatigue, "I feel like I'm always tired" and endorses " "poor concentration. Appetite good. Denies PMA. Denies SI/intent plan.     Attributes most of her anxiety to situational stressors r/t to raising her daughter. Denies generalized or excessive worrying outside of daily tasks/responsibilities. Dec'd energy and cocnentration. Denies feeling restless or tense. Does endorses some irritability.       Past Medical hx:   Past Medical History:   Diagnosis Date    ADD (attention deficit disorder)     Morbid obesity 12/4/2017    Obesity     Plantar fasciitis 5/30/2012        Interim hx:  Medication changes last visit: none  Anxiety: denies  Depression: denies     Denies suicidal/homicidal ideations.  Denies hopelessness/worthlessness.    Denies auditory/visual hallucinations      Tobacco: former smoker, quit x 6 years  Alcohol: "couldn't tell you the last time I had a drink"  Drug use: denies  Caffeine: 2 cups of coffee daily      Review of Systems   PSYCHIATRIC: Pertinent items are noted in the narrative.        CONSTITUTIONAL: weight stable        M/S: no pain today         ENT: no allergies noted today        ABD: no n/v/d     Past Medical, Family and Social History: The patient's past medical, family and social history have been reviewed and updated as appropriate within the electronic medical record. See encounter notes.     Medication: Adderall XR 10mg QD, lexapro 10mg qhd     Compliance: yes      Side effects: tolerates     Risk Parameters:  Patient reports no suicidal ideation  Patient reports no homicidal ideation  Patient reports no self-injurious behavior  Patient reports no violent behavior     Exam (detailed: at least 9 elements; comprehensive: all 15 elements)   Constitutional  Vitals:  Most recent vital signs, dated less than 90 days prior to this appointment, were reviewed. There were no vitals taken for this visit.     General:  unremarkable, age appropriate, casual attire, good eye contact, good rapport       Musculoskeletal  Muscle Strength/Tone:  no " "flaccidity, no tremor    Gait & Station:  normal      Psychiatric                       Speech:  normal tone, normal rate, rhythm, and volume   Mood & Affect:   Euthymic, congruent, appropriate         Thought Process:   Goal directed; Linear    Associations:   intact   Thought Content:   No SI/HI, delusions, or paranoia, no AV/VH   Insight & Judgement:   Good, adequate to circumstances   Orientation:   grossly intact; alert and oriented x 4    Memory:  intact for content of interview    Language:  grossly intact, can repeat    Attention Span  : Grossly intact for content of interview   Fund of Knowledge:   intact and appropriate to age and level of education        Assessment and Diagnosis   Status/Progress: Based on the examination today, the patient's problem(s) is/are under fair control.  New problems have not been presented today. Comorbidities are not currently complicating management of the primary condition.      Impression:      Pt is a 39 year old female with past psychiatric hx of ADD, "post partum depression", mood disorder NOS who presents for follow up treatment. She is currently taking Lexapro 20mg QD, and  Adderall XR to 25mg QD.     Between visits, pt reports that their depression has been well-controlled. There are no decompensations noted or reported since their last session with me. Pt reports a good mood and denies anhedonia, hopelessness, or worthlessness. Reports good level of motivation and denies apathy or psychomotor slowing. Pt notes a good appetite, energy levels, and good quality of sleep. They are stable and high functioning.      Pt reports that their symptoms of ADD/ADHD are responding well to the current treatment plan. There is minimal distractibility and adequate level of focus. Able to function highly in most settings. Pt reports good ability to meet deadlines and accomplish tasks. They are sleeping well and report a normal appetite.       Diagnosis: ADD, mood disorder " unspecified    Intervention/Counseling/Treatment Plan   Medication Management:      1. Cont Adderall XR 25mg QD. Discussed risks of abuse potential, insomnia, anxiety, elevated BP, HR, arrthymias, MI, stroke, sudden death     2. Cont Lexapro 20mg QD for mood/anxiety.  Discussed potential for GI side effects, sexual dysfunction, mood destabilization, headaches     3. Call to report any worsening of symptoms or problems with the medication. Pt instructed to go to ER with thoughts of harming self, others     4. Patient given contact # for psychotherapists at [facility] and also instructed she may check with insurance for list of providers.      5. Labs: no new orders     Return to clinic: 3 month - self      -Spent 30min face to face with the pt; >50% time spent in counseling   -Supportive therapy and psychoeducation provided  -R/B/SE's of medications discussed with the pt who expresses understanding and chooses to take medications as prescribed.   -Pt instructed to call clinic, 911 or go to nearest emergency room if sxs worsen or pt is in   crisis. The pt expresses understanding.    Vito Brantley, NP

## 2024-05-27 RX ORDER — DEXTROAMPHETAMINE SACCHARATE, AMPHETAMINE ASPARTATE MONOHYDRATE, DEXTROAMPHETAMINE SULFATE AND AMPHETAMINE SULFATE 6.25; 6.25; 6.25; 6.25 MG/1; MG/1; MG/1; MG/1
25 CAPSULE, EXTENDED RELEASE ORAL EVERY MORNING
Qty: 30 CAPSULE | Refills: 0 | Status: SHIPPED | OUTPATIENT
Start: 2024-05-27

## 2024-06-26 RX ORDER — DEXTROAMPHETAMINE SACCHARATE, AMPHETAMINE ASPARTATE MONOHYDRATE, DEXTROAMPHETAMINE SULFATE AND AMPHETAMINE SULFATE 6.25; 6.25; 6.25; 6.25 MG/1; MG/1; MG/1; MG/1
25 CAPSULE, EXTENDED RELEASE ORAL EVERY MORNING
Qty: 30 CAPSULE | Refills: 0 | Status: SHIPPED | OUTPATIENT
Start: 2024-06-26

## 2024-08-15 ENCOUNTER — OFFICE VISIT (OUTPATIENT)
Dept: PSYCHIATRY | Facility: CLINIC | Age: 39
End: 2024-08-15
Payer: COMMERCIAL

## 2024-08-15 DIAGNOSIS — F98.8 ATTENTION DEFICIT DISORDER, UNSPECIFIED HYPERACTIVITY PRESENCE: ICD-10-CM

## 2024-08-15 DIAGNOSIS — F39 UNSPECIFIED MOOD (AFFECTIVE) DISORDER: Primary | ICD-10-CM

## 2024-08-15 PROCEDURE — 1160F RVW MEDS BY RX/DR IN RCRD: CPT | Mod: CPTII,95,, | Performed by: NURSE PRACTITIONER

## 2024-08-15 PROCEDURE — 99214 OFFICE O/P EST MOD 30 MIN: CPT | Mod: 95,,, | Performed by: NURSE PRACTITIONER

## 2024-08-15 PROCEDURE — 1159F MED LIST DOCD IN RCRD: CPT | Mod: CPTII,95,, | Performed by: NURSE PRACTITIONER

## 2024-08-15 NOTE — PROGRESS NOTES
"Outpatient Psychiatry Follow-Up Visit    Clinical Status of Patient: Outpatient (Virtual)  08/15/2024       [address] [place] [address]  [phone] (M)  [phone] (H)  Visit type: Virtual visit with synchronous audio and video  Each patient to whom he or she provides medical services by telemedicine is:  (1) informed of the relationship between the physician and patient and the respective role of any other health care provider with respect to management of the patient; and (2) notified that he or she may decline to receive medical services by telemedicine and may withdraw from such care at any time.    Chief Complaint: Pt is a 39 year old female who presents today for a follow-up. Met with patient.       Interval History and Content of Current Session:  Interim Events/Subjective Report/Content of Current Session:  follow up appointment.    Pt is a 39 year old female with past psychiatric hx of ADD, "post partum depression", mood disorder NOS who presents for follow up treatment. She is currently taking Lexapro 20mg QD, and  Adderall XR to 25mg QD. Meds therapeutic and well-tolerated.    Between visits, pt reports that their depression has been well-controlled. There are no decompensations noted or reported since their last session with me. Pt reports a good mood and denies anhedonia, hopelessness, or worthlessness. Reports good level of motivation and denies apathy or psychomotor slowing. Pt notes a good appetite, energy levels, and good quality of sleep. They are stable and high functioning.     Pt reports that their symptoms of ADD/ADHD are responding well to the current treatment plan. There is minimal distractibility and adequate level of focus. Able to function highly in most settings. Pt reports good ability to meet deadlines and accomplish tasks. They are sleeping well and report a normal appetite.           Past Psychiatric hx: Pt. is a 36 year old female with a past psychiatric hx of ADD who " "established care with me 09/19. Patient also has a past medical history of morbid obesity. She presented to me not taking any psychiatric meds but does report on previous trial of Adderall which was therapeutic and well-tolerated - d/c this upon becoming pregnant about 4 years ago. Now going back to work and feels she would like to re-start her ADD medications.      Pt speaks to a long hx of inattention and distractibility that began in childhood and prior to age 10. "I was never able to sit still in class and was always thinking about everything except what they were saying. I've always been late to everything and forgetful." States that she has struggled with poor focus, and inability to stay task-oriented. Has been a procrastinator in the past. This has affected her at home in completing chores and normal household duties. States that she is "totally unable to sit still." She was eventually diagnosed with ADD about 7 years ago by her PCP, and started on Adderall with good efficacy and tolerability. "It changed my life. I got so many things accomplished. I could finally complete everything that's on my list."     Has a 1 and 3 year old. Her 1 year old has been diagnosed with spina bifida and hydrocephalus, requiring lots of at-home care. Her  travels frequently with the , and "we unexpectedly had a special needs child, and I have felt totally overwhelmed and like I'm carrying the weight of the world." States that her child had 6 major surgeries before she was 6 months old - has been totally overwhelming for her. Her  was deployed during her pregnancy and when they discovered the child's diagnosis.     Denies any hx of depression. Denies anhedonia, denies feelings of of hopelessness and worthlessness. Expresses some guilt that "what happened to by daughter may be kind of my fault". Dec'd sleep quality, gets around 4-5 hours of sleep but attributes this to "teething kids." Endorses fatigue, "I " "feel like I'm always tired" and endorses poor concentration. Appetite good. Denies PMA. Denies SI/intent plan.     Attributes most of her anxiety to situational stressors r/t to raising her daughter. Denies generalized or excessive worrying outside of daily tasks/responsibilities. Dec'd energy and cocnentration. Denies feeling restless or tense. Does endorses some irritability.       Past Medical hx:   Past Medical History:   Diagnosis Date    ADD (attention deficit disorder)     Morbid obesity 12/4/2017    Obesity     Plantar fasciitis 5/30/2012        Interim hx:  Medication changes last visit: none  Anxiety: denies  Depression: denies     Denies suicidal/homicidal ideations.  Denies hopelessness/worthlessness.    Denies auditory/visual hallucinations      Tobacco: former smoker, quit x 6 years  Alcohol: "couldn't tell you the last time I had a drink"  Drug use: denies  Caffeine: 2 cups of coffee daily      Review of Systems   PSYCHIATRIC: Pertinent items are noted in the narrative.        CONSTITUTIONAL: weight stable        M/S: no pain today         ENT: no allergies noted today        ABD: no n/v/d     Past Medical, Family and Social History: The patient's past medical, family and social history have been reviewed and updated as appropriate within the electronic medical record. See encounter notes.     Medication: Adderall XR 10mg QD, lexapro 10mg qhd     Compliance: yes      Side effects: tolerates     Risk Parameters:  Patient reports no suicidal ideation  Patient reports no homicidal ideation  Patient reports no self-injurious behavior  Patient reports no violent behavior     Exam (detailed: at least 9 elements; comprehensive: all 15 elements)   Constitutional  Vitals:  Most recent vital signs, dated less than 90 days prior to this appointment, were reviewed. There were no vitals taken for this visit.     General:  unremarkable, age appropriate, casual attire, good eye contact, good rapport     " "  Musculoskeletal  Muscle Strength/Tone:  no flaccidity, no tremor    Gait & Station:  normal      Psychiatric                       Speech:  normal tone, normal rate, rhythm, and volume   Mood & Affect:   Euthymic, congruent, appropriate         Thought Process:   Goal directed; Linear    Associations:   intact   Thought Content:   No SI/HI, delusions, or paranoia, no AV/VH   Insight & Judgement:   Good, adequate to circumstances   Orientation:   grossly intact; alert and oriented x 4    Memory:  intact for content of interview    Language:  grossly intact, can repeat    Attention Span  : Grossly intact for content of interview   Fund of Knowledge:   intact and appropriate to age and level of education        Assessment and Diagnosis   Status/Progress: Based on the examination today, the patient's problem(s) is/are under fair control.  New problems have not been presented today. Comorbidities are not currently complicating management of the primary condition.      Impression:      Pt is a 39 year old female with past psychiatric hx of ADD, "post partum depression", mood disorder NOS who presents for follow up treatment. She is currently taking Lexapro 20mg QD, and  Adderall XR to 25mg QD. Meds therapeutic and well-tolerated.    Between visits, pt reports that their depression has been well-controlled. There are no decompensations noted or reported since their last session with me. Pt reports a good mood and denies anhedonia, hopelessness, or worthlessness. Reports good level of motivation and denies apathy or psychomotor slowing. Pt notes a good appetite, energy levels, and good quality of sleep. They are stable and high functioning.     Pt reports that their symptoms of ADD/ADHD are responding well to the current treatment plan. There is minimal distractibility and adequate level of focus. Able to function highly in most settings. Pt reports good ability to meet deadlines and accomplish tasks. They are sleeping " well and report a normal appetite.       Diagnosis: ADD, mood disorder unspecified    Intervention/Counseling/Treatment Plan   Medication Management:      1. Cont Adderall XR 25mg QD. Discussed risks of abuse potential, insomnia, anxiety, elevated BP, HR, arrthymias, MI, stroke, sudden death     2. Cont Lexapro 20mg QD for mood/anxiety.  Discussed potential for GI side effects, sexual dysfunction, mood destabilization, headaches     3. Call to report any worsening of symptoms or problems with the medication. Pt instructed to go to ER with thoughts of harming self, others     4. Patient given contact # for psychotherapists at [facility] and also instructed she may check with insurance for list of providers.      5. Labs: no new orders     Return to clinic: 3 month - self      -Spent 30min face to face with the pt; >50% time spent in counseling   -Supportive therapy and psychoeducation provided  -R/B/SE's of medications discussed with the pt who expresses understanding and chooses to take medications as prescribed.   -Pt instructed to call clinic, 911 or go to nearest emergency room if sxs worsen or pt is in   crisis. The pt expresses understanding.    Vito Brantley, NP

## 2024-08-26 RX ORDER — DEXTROAMPHETAMINE SACCHARATE, AMPHETAMINE ASPARTATE MONOHYDRATE, DEXTROAMPHETAMINE SULFATE AND AMPHETAMINE SULFATE 6.25; 6.25; 6.25; 6.25 MG/1; MG/1; MG/1; MG/1
25 CAPSULE, EXTENDED RELEASE ORAL EVERY MORNING
Qty: 30 CAPSULE | Refills: 0 | Status: SHIPPED | OUTPATIENT
Start: 2024-08-26

## 2024-09-24 RX ORDER — ESCITALOPRAM OXALATE 20 MG/1
20 TABLET ORAL
Qty: 30 TABLET | Refills: 3 | Status: SHIPPED | OUTPATIENT
Start: 2024-09-24

## 2024-09-25 RX ORDER — DEXTROAMPHETAMINE SACCHARATE, AMPHETAMINE ASPARTATE MONOHYDRATE, DEXTROAMPHETAMINE SULFATE AND AMPHETAMINE SULFATE 6.25; 6.25; 6.25; 6.25 MG/1; MG/1; MG/1; MG/1
25 CAPSULE, EXTENDED RELEASE ORAL EVERY MORNING
Qty: 30 CAPSULE | Refills: 0 | Status: SHIPPED | OUTPATIENT
Start: 2024-09-25

## 2024-10-24 RX ORDER — DEXTROAMPHETAMINE SACCHARATE, AMPHETAMINE ASPARTATE MONOHYDRATE, DEXTROAMPHETAMINE SULFATE AND AMPHETAMINE SULFATE 6.25; 6.25; 6.25; 6.25 MG/1; MG/1; MG/1; MG/1
25 CAPSULE, EXTENDED RELEASE ORAL EVERY MORNING
Qty: 30 CAPSULE | Refills: 0 | Status: SHIPPED | OUTPATIENT
Start: 2024-10-24

## 2024-11-19 ENCOUNTER — OFFICE VISIT (OUTPATIENT)
Dept: PSYCHIATRY | Facility: CLINIC | Age: 39
End: 2024-11-19
Payer: COMMERCIAL

## 2024-11-19 DIAGNOSIS — F39 UNSPECIFIED MOOD (AFFECTIVE) DISORDER: ICD-10-CM

## 2024-11-19 DIAGNOSIS — F98.8 ATTENTION DEFICIT DISORDER, UNSPECIFIED TYPE: Primary | ICD-10-CM

## 2024-11-19 PROCEDURE — 1160F RVW MEDS BY RX/DR IN RCRD: CPT | Mod: CPTII,95,, | Performed by: NURSE PRACTITIONER

## 2024-11-19 PROCEDURE — 99214 OFFICE O/P EST MOD 30 MIN: CPT | Mod: 95,,, | Performed by: NURSE PRACTITIONER

## 2024-11-19 PROCEDURE — 1159F MED LIST DOCD IN RCRD: CPT | Mod: CPTII,95,, | Performed by: NURSE PRACTITIONER

## 2024-11-19 NOTE — PROGRESS NOTES
"Outpatient Psychiatry Follow-Up Visit    Clinical Status of Patient: Outpatient (Virtual)  11/19/2024       [id number] [place] [address]  [phone] (M)  [phone] (H)  Visit type: Virtual visit with synchronous audio and video  Each patient to whom he or she provides medical services by telemedicine is:  (1) informed of the relationship between the physician and patient and the respective role of any other health care provider with respect to management of the patient; and (2) notified that he or she may decline to receive medical services by telemedicine and may withdraw from such care at any time.    Chief Complaint: Pt is a 39 year old female who presents today for a follow-up. Met with patient.       Interval History and Content of Current Session:  Interim Events/Subjective Report/Content of Current Session:  follow up appointment.    Pt is a 39 year old female with past psychiatric hx of ADD, "post partum depression", mood disorder NOS who presents for follow up treatment. She is currently taking Lexapro 20mg QD, and  Adderall XR 25mg QD. Meds therapeutic and well-tolerated.    Last seen by me 3 months ago and was doing fine at that visit - no med changes were made              Past Psychiatric hx: Pt. is a 36 year old female with a past psychiatric hx of ADD who established care with me 09/19. Patient also has a past medical history of morbid obesity. She presented to me not taking any psychiatric meds but does report on previous trial of Adderall which was therapeutic and well-tolerated - d/c this upon becoming pregnant about 4 years ago. Now going back to work and feels she would like to re-start her ADD medications.      Pt speaks to a long hx of inattention and distractibility that began in childhood and prior to age 10. "I was never able to sit still in class and was always thinking about everything except what they were saying. I've always been late to everything and forgetful." States " "that she has struggled with poor focus, and inability to stay task-oriented. Has been a procrastinator in the past. This has affected her at home in completing chores and normal household duties. States that she is "totally unable to sit still." She was eventually diagnosed with ADD about 7 years ago by her PCP, and started on Adderall with good efficacy and tolerability. "It changed my life. I got so many things accomplished. I could finally complete everything that's on my list."     Has a 1 and 3 year old. Her 1 year old has been diagnosed with spina bifida and hydrocephalus, requiring lots of at-home care. Her  travels frequently with the , and "we unexpectedly had a special needs child, and I have felt totally overwhelmed and like I'm carrying the weight of the world." States that her child had 6 major surgeries before she was 6 months old - has been totally overwhelming for her. Her  was deployed during her pregnancy and when they discovered the child's diagnosis.     Denies any hx of depression. Denies anhedonia, denies feelings of of hopelessness and worthlessness. Expresses some guilt that "what happened to by daughter may be kind of my fault". Dec'd sleep quality, gets around 4-5 hours of sleep but attributes this to "teething kids." Endorses fatigue, "I feel like I'm always tired" and endorses poor concentration. Appetite good. Denies PMA. Denies SI/intent plan.     Attributes most of her anxiety to situational stressors r/t to raising her daughter. Denies generalized or excessive worrying outside of daily tasks/responsibilities. Dec'd energy and cocnentration. Denies feeling restless or tense. Does endorses some irritability.       Past Medical hx:   Past Medical History:   Diagnosis Date    ADD (attention deficit disorder)     Morbid obesity 12/4/2017    Obesity     Plantar fasciitis 5/30/2012        Interim hx:  Medication changes last visit: none  Anxiety: denies  Depression: " "denies     Denies suicidal/homicidal ideations.  Denies hopelessness/worthlessness.    Denies auditory/visual hallucinations      Tobacco: former smoker, quit x 6 years  Alcohol: "couldn't tell you the last time I had a drink"  Drug use: denies  Caffeine: 2 cups of coffee daily      Review of Systems   PSYCHIATRIC: Pertinent items are noted in the narrative.        CONSTITUTIONAL: weight stable        M/S: no pain today         ENT: no allergies noted today        ABD: no n/v/d     Past Medical, Family and Social History: The patient's past medical, family and social history have been reviewed and updated as appropriate within the electronic medical record. See encounter notes.     Medication: Adderall XR 10mg QD, lexapro 10mg qhd     Compliance: yes      Side effects: tolerates     Risk Parameters:  Patient reports no suicidal ideation  Patient reports no homicidal ideation  Patient reports no self-injurious behavior  Patient reports no violent behavior     Exam (detailed: at least 9 elements; comprehensive: all 15 elements)   Constitutional  Vitals:  Most recent vital signs, dated less than 90 days prior to this appointment, were reviewed. There were no vitals taken for this visit.     General:  unremarkable, age appropriate, casual attire, good eye contact, good rapport       Musculoskeletal  Muscle Strength/Tone:  no flaccidity, no tremor    Gait & Station:  normal      Psychiatric                       Speech:  normal tone, normal rate, rhythm, and volume   Mood & Affect:   Euthymic, congruent, appropriate         Thought Process:   Goal directed; Linear    Associations:   intact   Thought Content:   No SI/HI, delusions, or paranoia, no AV/VH   Insight & Judgement:   Good, adequate to circumstances   Orientation:   grossly intact; alert and oriented x 4    Memory:  intact for content of interview    Language:  grossly intact, can repeat    Attention Span  : Grossly intact for content of interview   Fund of " "Knowledge:   intact and appropriate to age and level of education        Assessment and Diagnosis   Status/Progress: Based on the examination today, the patient's problem(s) is/are under fair control.  New problems have not been presented today. Comorbidities are not currently complicating management of the primary condition.      Impression:      Pt is a 39 year old female with past psychiatric hx of ADD, "post partum depression", mood disorder NOS who presents for follow up treatment. She is currently taking Lexapro 20mg QD, and  Adderall XR to 25mg QD. Meds therapeutic and well-tolerated.    Between visits, pt reports that their depression has been well-controlled. There are no decompensations noted or reported since their last session with me. Pt reports a good mood and denies anhedonia, hopelessness, or worthlessness. Reports good level of motivation and denies apathy or psychomotor slowing. Pt notes a good appetite, energy levels, and good quality of sleep. They are stable and high functioning.     Pt reports that their symptoms of ADD/ADHD are responding well to the current treatment plan. There is minimal distractibility and adequate level of focus. Able to function highly in most settings. Pt reports good ability to meet deadlines and accomplish tasks. They are sleeping well and report a normal appetite.       Diagnosis: ADD, mood disorder unspecified    Intervention/Counseling/Treatment Plan   Medication Management:      1. Cont Adderall XR 25mg QD. Discussed risks of abuse potential, insomnia, anxiety, elevated BP, HR, arrthymias, MI, stroke, sudden death     2. Cont Lexapro 20mg QD for mood/anxiety.  Discussed potential for GI side effects, sexual dysfunction, mood destabilization, headaches     3. Call to report any worsening of symptoms or problems with the medication. Pt instructed to go to ER with thoughts of harming self, others     4. Patient given contact # for psychotherapists at [facility] and also instructed she may check with insurance for list of providers.      5. Labs: no new orders     Return to clinic: 3 month - self      -Spent 30min face to face with the pt; >50% time spent in counseling   -Supportive therapy and psychoeducation provided  -R/B/SE's of medications discussed with the pt who expresses understanding and chooses to take medications as prescribed.   -Pt instructed to call clinic, 911 or go to nearest emergency room if sxs worsen or pt is in   crisis. The pt expresses understanding.    Vito Brantley, NP

## 2024-11-23 DIAGNOSIS — F98.8 ATTENTION DEFICIT DISORDER, UNSPECIFIED TYPE: Primary | ICD-10-CM

## 2024-11-25 RX ORDER — DEXTROAMPHETAMINE SACCHARATE, AMPHETAMINE ASPARTATE MONOHYDRATE, DEXTROAMPHETAMINE SULFATE AND AMPHETAMINE SULFATE 6.25; 6.25; 6.25; 6.25 MG/1; MG/1; MG/1; MG/1
25 CAPSULE, EXTENDED RELEASE ORAL EVERY MORNING
Qty: 30 CAPSULE | Refills: 0 | Status: SHIPPED | OUTPATIENT
Start: 2024-11-25

## 2024-12-26 DIAGNOSIS — F98.8 ATTENTION DEFICIT DISORDER, UNSPECIFIED TYPE: ICD-10-CM

## 2024-12-27 RX ORDER — DEXTROAMPHETAMINE SACCHARATE, AMPHETAMINE ASPARTATE MONOHYDRATE, DEXTROAMPHETAMINE SULFATE AND AMPHETAMINE SULFATE 6.25; 6.25; 6.25; 6.25 MG/1; MG/1; MG/1; MG/1
25 CAPSULE, EXTENDED RELEASE ORAL EVERY MORNING
Qty: 30 CAPSULE | Refills: 0 | Status: SHIPPED | OUTPATIENT
Start: 2024-12-27

## 2025-01-24 DIAGNOSIS — F98.8 ATTENTION DEFICIT DISORDER, UNSPECIFIED TYPE: ICD-10-CM

## 2025-01-24 RX ORDER — DEXTROAMPHETAMINE SACCHARATE, AMPHETAMINE ASPARTATE MONOHYDRATE, DEXTROAMPHETAMINE SULFATE AND AMPHETAMINE SULFATE 6.25; 6.25; 6.25; 6.25 MG/1; MG/1; MG/1; MG/1
25 CAPSULE, EXTENDED RELEASE ORAL EVERY MORNING
Qty: 30 CAPSULE | Refills: 0 | Status: SHIPPED | OUTPATIENT
Start: 2025-01-24

## 2025-01-31 RX ORDER — ESCITALOPRAM OXALATE 20 MG/1
20 TABLET ORAL DAILY
Qty: 30 TABLET | Refills: 3 | Status: SHIPPED | OUTPATIENT
Start: 2025-01-31

## 2025-02-26 ENCOUNTER — OFFICE VISIT (OUTPATIENT)
Dept: PSYCHIATRY | Facility: CLINIC | Age: 40
End: 2025-02-26
Payer: COMMERCIAL

## 2025-02-26 VITALS
WEIGHT: 284.81 LBS | HEART RATE: 91 BPM | DIASTOLIC BLOOD PRESSURE: 69 MMHG | SYSTOLIC BLOOD PRESSURE: 121 MMHG | BODY MASS INDEX: 47.45 KG/M2 | HEIGHT: 65 IN

## 2025-02-26 DIAGNOSIS — F39 UNSPECIFIED MOOD (AFFECTIVE) DISORDER: Primary | ICD-10-CM

## 2025-02-26 DIAGNOSIS — F98.8 ATTENTION DEFICIT DISORDER, UNSPECIFIED TYPE: ICD-10-CM

## 2025-02-26 PROCEDURE — 99999 PR PBB SHADOW E&M-EST. PATIENT-LVL III: CPT | Mod: PBBFAC,,, | Performed by: NURSE PRACTITIONER

## 2025-02-26 PROCEDURE — 1159F MED LIST DOCD IN RCRD: CPT | Mod: CPTII,S$GLB,, | Performed by: NURSE PRACTITIONER

## 2025-02-26 PROCEDURE — 3078F DIAST BP <80 MM HG: CPT | Mod: CPTII,S$GLB,, | Performed by: NURSE PRACTITIONER

## 2025-02-26 PROCEDURE — 99214 OFFICE O/P EST MOD 30 MIN: CPT | Mod: S$GLB,,, | Performed by: NURSE PRACTITIONER

## 2025-02-26 PROCEDURE — 3074F SYST BP LT 130 MM HG: CPT | Mod: CPTII,S$GLB,, | Performed by: NURSE PRACTITIONER

## 2025-02-26 PROCEDURE — 3008F BODY MASS INDEX DOCD: CPT | Mod: CPTII,S$GLB,, | Performed by: NURSE PRACTITIONER

## 2025-02-26 PROCEDURE — 1160F RVW MEDS BY RX/DR IN RCRD: CPT | Mod: CPTII,S$GLB,, | Performed by: NURSE PRACTITIONER

## 2025-02-26 RX ORDER — DEXTROAMPHETAMINE SACCHARATE, AMPHETAMINE ASPARTATE MONOHYDRATE, DEXTROAMPHETAMINE SULFATE AND AMPHETAMINE SULFATE 6.25; 6.25; 6.25; 6.25 MG/1; MG/1; MG/1; MG/1
25 CAPSULE, EXTENDED RELEASE ORAL EVERY MORNING
Qty: 30 CAPSULE | Refills: 0 | Status: SHIPPED | OUTPATIENT
Start: 2025-02-26

## 2025-02-26 NOTE — PROGRESS NOTES
"Outpatient Psychiatry Follow-Up Visit    Clinical Status of Patient: Outpatient (Ambulatory)  02/26/2025     Chief Complaint: Pt is a 40 year old female who presents today for a follow-up. Met with patient.       Interval History and Content of Current Session:  Interim Events/Subjective Report/Content of Current Session:  follow up appointment.    Pt is a 40 year old female with past psychiatric hx of ADD, "post partum depression", mood disorder NOS who presents for follow up treatment. She is currently taking Lexapro 20mg QD, and  Adderall XR 25mg QD. Meds therapeutic and well-tolerated. Last seen by me 3 months ago and was doing fine at that visit - no med changes were made.    Pt reports that their symptoms of ADD/ADHD are responding well to the current treatment plan. There is minimal distractibility and adequate level of focus. Able to function highly in most settings. Pt reports good ability to meet deadlines and accomplish tasks. They are sleeping well and report a normal appetite.     Between visits, the patient reports that their depression is well-controlled, with no signs of decompensation since the last session. They describe a good mood and deny experiencing anhedonia, hopelessness, or feelings of worthlessness. The patient reports maintaining a good level of motivation and denies apathy or psychomotor slowing. Additionally, they note having a healthy appetite, stable energy levels, and good-quality sleep. Overall, they remain stable and highly functional.  -          Past Psychiatric hx: Pt. is a 36 year old female with a past psychiatric hx of ADD who established care with me 09/19. Patient also has a past medical history of morbid obesity. She presented to me not taking any psychiatric meds but does report on previous trial of Adderall which was therapeutic and well-tolerated - d/c this upon becoming pregnant about 4 years ago. Now going back to work and feels she would like to re-start her ADD " "medications.      Pt speaks to a long hx of inattention and distractibility that began in childhood and prior to age 10. "I was never able to sit still in class and was always thinking about everything except what they were saying. I've always been late to everything and forgetful." States that she has struggled with poor focus, and inability to stay task-oriented. Has been a procrastinator in the past. This has affected her at home in completing chores and normal household duties. States that she is "totally unable to sit still." She was eventually diagnosed with ADD about 7 years ago by her PCP, and started on Adderall with good efficacy and tolerability. "It changed my life. I got so many things accomplished. I could finally complete everything that's on my list."     Has a 1 and 3 year old. Her 1 year old has been diagnosed with spina bifida and hydrocephalus, requiring lots of at-home care. Her  travels frequently with the , and "we unexpectedly had a special needs child, and I have felt totally overwhelmed and like I'm carrying the weight of the world." States that her child had 6 major surgeries before she was 6 months old - has been totally overwhelming for her. Her  was deployed during her pregnancy and when they discovered the child's diagnosis.     Denies any hx of depression. Denies anhedonia, denies feelings of of hopelessness and worthlessness. Expresses some guilt that "what happened to by daughter may be kind of my fault". Dec'd sleep quality, gets around 4-5 hours of sleep but attributes this to "teething kids." Endorses fatigue, "I feel like I'm always tired" and endorses poor concentration. Appetite good. Denies PMA. Denies SI/intent plan.     Attributes most of her anxiety to situational stressors r/t to raising her daughter. Denies generalized or excessive worrying outside of daily tasks/responsibilities. Dec'd energy and cocnentration. Denies feeling restless or tense. " "Does endorses some irritability.       Past Medical hx:   Past Medical History:   Diagnosis Date    ADD (attention deficit disorder)     Morbid obesity 12/4/2017    Obesity     Plantar fasciitis 5/30/2012        Interim hx:  Medication changes last visit: none  Anxiety: denies  Depression: denies     Denies suicidal/homicidal ideations.  Denies hopelessness/worthlessness.    Denies auditory/visual hallucinations      Tobacco: former smoker, quit x 6 years  Alcohol: "couldn't tell you the last time I had a drink"  Drug use: denies  Caffeine: 2 cups of coffee daily      Review of Systems   PSYCHIATRIC: Pertinent items are noted in the narrative.        CONSTITUTIONAL: weight stable        M/S: no pain today         ENT: no allergies noted today        ABD: no n/v/d     Past Medical, Family and Social History: The patient's past medical, family and social history have been reviewed and updated as appropriate within the electronic medical record. See encounter notes.     Medication: Adderall XR 10mg QD, lexapro 10mg qhd     Compliance: yes      Side effects: tolerates     Risk Parameters:  Patient reports no suicidal ideation  Patient reports no homicidal ideation  Patient reports no self-injurious behavior  Patient reports no violent behavior     Exam (detailed: at least 9 elements; comprehensive: all 15 elements)   Constitutional  Vitals:  Most recent vital signs, dated less than 90 days prior to this appointment, were reviewed. There were no vitals taken for this visit.     General:  unremarkable, age appropriate, casual attire, good eye contact, good rapport       Musculoskeletal  Muscle Strength/Tone:  no flaccidity, no tremor    Gait & Station:  normal      Psychiatric                       Speech:  normal tone, normal rate, rhythm, and volume   Mood & Affect:   Euthymic, congruent, appropriate         Thought Process:   Goal directed; Linear    Associations:   intact   Thought Content:   No SI/HI, delusions, or " "paranoia, no AV/VH   Insight & Judgement:   Good, adequate to circumstances   Orientation:   grossly intact; alert and oriented x 4    Memory:  intact for content of interview    Language:  grossly intact, can repeat    Attention Span  : Grossly intact for content of interview   Fund of Knowledge:   intact and appropriate to age and level of education        Assessment and Diagnosis   Status/Progress: Based on the examination today, the patient's problem(s) is/are under fair control.  New problems have not been presented today. Comorbidities are not currently complicating management of the primary condition.      Impression:          Pt is a 40 year old female with past psychiatric hx of ADD, "post partum depression", mood disorder NOS who presents for follow up treatment. She is currently taking Lexapro 20mg QD, and  Adderall XR 25mg QD. Meds therapeutic and well-tolerated. Last seen by me 3 months ago and was doing fine at that visit - no med changes were made.    Pt reports that their symptoms of ADD/ADHD are responding well to the current treatment plan. There is minimal distractibility and adequate level of focus. Able to function highly in most settings. Pt reports good ability to meet deadlines and accomplish tasks. They are sleeping well and report a normal appetite.     Between visits, the patient reports that their depression is well-controlled, with no signs of decompensation since the last session. They describe a good mood and deny experiencing anhedonia, hopelessness, or feelings of worthlessness. The patient reports maintaining a good level of motivation and denies apathy or psychomotor slowing. Additionally, they note having a healthy appetite, stable energy levels, and good-quality sleep. Overall, they remain stable and highly functional.  -        Diagnosis: ADD, mood disorder unspecified    Intervention/Counseling/Treatment Plan   Medication Management:      1. Cont Adderall XR 25mg QD. Discussed " risks of abuse potential, insomnia, anxiety, elevated BP, HR, arrthymias, MI, stroke, sudden death     2. Cont Lexapro 20mg QD for mood/anxiety.  Discussed potential for GI side effects, sexual dysfunction, mood destabilization, headaches     3. Call to report any worsening of symptoms or problems with the medication. Pt instructed to go to ER with thoughts of harming self, others     4. Patient given contact # for psychotherapists at [facility] and also instructed she may check with insurance for list of providers.      5. Labs: no new orders     Return to clinic: 3 month - self      -Spent 30min face to face with the pt; >50% time spent in counseling   -Supportive therapy and psychoeducation provided  -R/B/SE's of medications discussed with the pt who expresses understanding and chooses to take medications as prescribed.   -Pt instructed to call clinic, 911 or go to nearest emergency room if sxs worsen or pt is in   crisis. The pt expresses understanding.    Vito Brantley, NP

## 2025-03-24 DIAGNOSIS — F98.8 ATTENTION DEFICIT DISORDER, UNSPECIFIED TYPE: ICD-10-CM

## 2025-03-24 RX ORDER — DEXTROAMPHETAMINE SACCHARATE, AMPHETAMINE ASPARTATE MONOHYDRATE, DEXTROAMPHETAMINE SULFATE AND AMPHETAMINE SULFATE 6.25; 6.25; 6.25; 6.25 MG/1; MG/1; MG/1; MG/1
25 CAPSULE, EXTENDED RELEASE ORAL EVERY MORNING
Qty: 30 CAPSULE | Refills: 0 | Status: SHIPPED | OUTPATIENT
Start: 2025-03-24

## 2025-04-28 DIAGNOSIS — F98.8 ATTENTION DEFICIT DISORDER, UNSPECIFIED TYPE: ICD-10-CM

## 2025-04-28 RX ORDER — DEXTROAMPHETAMINE SACCHARATE, AMPHETAMINE ASPARTATE MONOHYDRATE, DEXTROAMPHETAMINE SULFATE AND AMPHETAMINE SULFATE 6.25; 6.25; 6.25; 6.25 MG/1; MG/1; MG/1; MG/1
25 CAPSULE, EXTENDED RELEASE ORAL EVERY MORNING
Qty: 30 CAPSULE | Refills: 0 | Status: SHIPPED | OUTPATIENT
Start: 2025-04-28

## 2025-05-28 DIAGNOSIS — F98.8 ATTENTION DEFICIT DISORDER, UNSPECIFIED TYPE: ICD-10-CM

## 2025-05-28 RX ORDER — DEXTROAMPHETAMINE SACCHARATE, AMPHETAMINE ASPARTATE MONOHYDRATE, DEXTROAMPHETAMINE SULFATE AND AMPHETAMINE SULFATE 6.25; 6.25; 6.25; 6.25 MG/1; MG/1; MG/1; MG/1
25 CAPSULE, EXTENDED RELEASE ORAL EVERY MORNING
Qty: 30 CAPSULE | Refills: 0 | Status: SHIPPED | OUTPATIENT
Start: 2025-05-28

## 2025-06-03 RX ORDER — ESCITALOPRAM OXALATE 20 MG/1
20 TABLET ORAL DAILY
Qty: 30 TABLET | Refills: 3 | Status: SHIPPED | OUTPATIENT
Start: 2025-06-03

## 2025-06-30 DIAGNOSIS — F98.8 ATTENTION DEFICIT DISORDER, UNSPECIFIED TYPE: ICD-10-CM

## 2025-06-30 NOTE — TELEPHONE ENCOUNTER
Adderall xr 25mg     Last ordered: 1 month ago (5/28/2025) by Vito Brantley, SERVANDO       Lexapro 20 mg    Last ordered: 3 weeks ago (6/3/2025) by Vito Brantley, NP       Nov 7/10/25  Lov 2/26/25

## 2025-07-01 RX ORDER — DEXTROAMPHETAMINE SACCHARATE, AMPHETAMINE ASPARTATE MONOHYDRATE, DEXTROAMPHETAMINE SULFATE AND AMPHETAMINE SULFATE 6.25; 6.25; 6.25; 6.25 MG/1; MG/1; MG/1; MG/1
25 CAPSULE, EXTENDED RELEASE ORAL EVERY MORNING
Qty: 30 CAPSULE | Refills: 0 | Status: SHIPPED | OUTPATIENT
Start: 2025-07-01

## 2025-07-01 RX ORDER — ESCITALOPRAM OXALATE 20 MG/1
20 TABLET ORAL DAILY
Qty: 30 TABLET | Refills: 3 | Status: SHIPPED | OUTPATIENT
Start: 2025-07-01

## 2025-07-10 ENCOUNTER — OFFICE VISIT (OUTPATIENT)
Dept: PSYCHIATRY | Facility: CLINIC | Age: 40
End: 2025-07-10
Payer: COMMERCIAL

## 2025-07-10 DIAGNOSIS — F98.8 ATTENTION DEFICIT DISORDER, UNSPECIFIED TYPE: Primary | ICD-10-CM

## 2025-07-10 DIAGNOSIS — F39 UNSPECIFIED MOOD (AFFECTIVE) DISORDER: ICD-10-CM

## 2025-07-10 PROCEDURE — 1159F MED LIST DOCD IN RCRD: CPT | Mod: CPTII,95,, | Performed by: NURSE PRACTITIONER

## 2025-07-10 PROCEDURE — 1160F RVW MEDS BY RX/DR IN RCRD: CPT | Mod: CPTII,95,, | Performed by: NURSE PRACTITIONER

## 2025-07-10 PROCEDURE — 98006 SYNCH AUDIO-VIDEO EST MOD 30: CPT | Mod: 95,,, | Performed by: NURSE PRACTITIONER

## 2025-07-10 NOTE — PROGRESS NOTES
"Outpatient Psychiatry Follow-Up Visit    Clinical Status of Patient: Outpatient (Virtual)  07/10/2025         [address] [address] [place] [address]     [phone] (M)  [phone] (H)  Visit type: Virtual visit with synchronous audio and video  Each patient to whom he or she provides medical services by telemedicine is:  (1) informed of the relationship between the physician and patient and the respective role of any other health care provider with respect to management of the patient; and (2) notified that he or she may decline to receive medical services by telemedicine and may withdraw from such care at any time.      Chief Complaint: Pt is a 40 year old female who presents today for a follow-up. Met with patient.       Interval History and Content of Current Session:  Interim Events/Subjective Report/Content of Current Session:  follow up appointment.    Pt is a 40 year old female with past psychiatric hx of ADD, "post partum depression", mood disorder NOS who presents for follow up treatment. She is currently taking Lexapro 20mg QD, and  Adderall XR 25mg QD. Meds therapeutic and well-tolerated.       Patient presents for a follow-up visit to discuss their current medication regimen for anxiety, depression, and ADHD.    Patient reports managing well with their current medication regimen, particularly noting the effectiveness of Lexapro. She states, "It keeps me calm and able to handle situations with my children." Patient mentions some stress due to changes in routine during the summer but overall feels she is coping adequately.    Patient is currently on Adderall XR 25mg for ADHD. She reports improvement in symptoms compared to before treatment, stating, "I'm much better than I ever was before." However, she notes that things are "not as efficient as I would like," but this does not cause her significant stress.    Patient reports some difficulty with sleep schedules due to summer routines, mentioning that " "family members are staying up later while still getting up at the same time. She is managing this by trying to nap and staying busy with outdoor activities. Her ability to complete tasks is impacted by having three children at home, but she does not attribute this directly to her medication or condition.    Patient mentions using outdoor activities, baseball, and camps to keep her children occupied during the summer. She also emphasizes the importance of staying busy and getting outside to manage family dynamics and maintain mental health.        Past Psychiatric hx: Pt. is a 36 year old female with a past psychiatric hx of ADD who established care with me 09/19. Patient also has a past medical history of morbid obesity. She presented to me not taking any psychiatric meds but does report on previous trial of Adderall which was therapeutic and well-tolerated - d/c this upon becoming pregnant about 4 years ago. Now going back to work and feels she would like to re-start her ADD medications.      Pt speaks to a long hx of inattention and distractibility that began in childhood and prior to age 10. "I was never able to sit still in class and was always thinking about everything except what they were saying. I've always been late to everything and forgetful." States that she has struggled with poor focus, and inability to stay task-oriented. Has been a procrastinator in the past. This has affected her at home in completing chores and normal household duties. States that she is "totally unable to sit still." She was eventually diagnosed with ADD about 7 years ago by her PCP, and started on Adderall with good efficacy and tolerability. "It changed my life. I got so many things accomplished. I could finally complete everything that's on my list."     Has a 1 and 3 year old. Her 1 year old has been diagnosed with spina bifida and hydrocephalus, requiring lots of at-home care. Her  travels frequently with the , " "and "we unexpectedly had a special needs child, and I have felt totally overwhelmed and like I'm carrying the weight of the world." States that her child had 6 major surgeries before she was 6 months old - has been totally overwhelming for her. Her  was deployed during her pregnancy and when they discovered the child's diagnosis.     Denies any hx of depression. Denies anhedonia, denies feelings of of hopelessness and worthlessness. Expresses some guilt that "what happened to by daughter may be kind of my fault". Dec'd sleep quality, gets around 4-5 hours of sleep but attributes this to "teething kids." Endorses fatigue, "I feel like I'm always tired" and endorses poor concentration. Appetite good. Denies PMA. Denies SI/intent plan.     Attributes most of her anxiety to situational stressors r/t to raising her daughter. Denies generalized or excessive worrying outside of daily tasks/responsibilities. Dec'd energy and cocnentration. Denies feeling restless or tense. Does endorses some irritability.       Past Medical hx:   Past Medical History:   Diagnosis Date    ADD (attention deficit disorder)     Morbid obesity 12/4/2017    Obesity     Plantar fasciitis 5/30/2012        Interim hx:  Medication changes last visit: none  Anxiety: denies  Depression: denies     Denies suicidal/homicidal ideations.  Denies hopelessness/worthlessness.    Denies auditory/visual hallucinations      Tobacco: former smoker, quit x 6 years  Alcohol: "couldn't tell you the last time I had a drink"  Drug use: denies  Caffeine: 2 cups of coffee daily      Review of Systems   PSYCHIATRIC: Pertinent items are noted in the narrative.        CONSTITUTIONAL: weight stable        M/S: no pain today         ENT: no allergies noted today        ABD: no n/v/d     Past Medical, Family and Social History: The patient's past medical, family and social history have been reviewed and updated as appropriate within the electronic medical record. See " "encounter notes.     Medication: Adderall XR 10mg QD, lexapro 10mg qhd     Compliance: yes      Side effects: tolerates     Risk Parameters:  Patient reports no suicidal ideation  Patient reports no homicidal ideation  Patient reports no self-injurious behavior  Patient reports no violent behavior     Exam (detailed: at least 9 elements; comprehensive: all 15 elements)   Constitutional  Vitals:  Most recent vital signs, dated less than 90 days prior to this appointment, were reviewed. There were no vitals taken for this visit.     General:  unremarkable, age appropriate, casual attire, good eye contact, good rapport       Musculoskeletal  Muscle Strength/Tone:  no flaccidity, no tremor    Gait & Station:  normal      Psychiatric                       Speech:  normal tone, normal rate, rhythm, and volume   Mood & Affect:   Euthymic, congruent, appropriate         Thought Process:   Goal directed; Linear    Associations:   intact   Thought Content:   No SI/HI, delusions, or paranoia, no AV/VH   Insight & Judgement:   Good, adequate to circumstances   Orientation:   grossly intact; alert and oriented x 4    Memory:  intact for content of interview    Language:  grossly intact, can repeat    Attention Span  : Grossly intact for content of interview   Fund of Knowledge:   intact and appropriate to age and level of education        Assessment and Diagnosis   Status/Progress: Based on the examination today, the patient's problem(s) is/are under fair control.  New problems have not been presented today. Comorbidities are not currently complicating management of the primary condition.      Impression:          Pt is a 40 year old female with past psychiatric hx of ADD, "post partum depression", mood disorder NOS who presents for follow up treatment. She is currently taking Lexapro 20mg QD, and  Adderall XR 25mg QD. Meds therapeutic and well-tolerated. Last seen by me 3 months ago and was doing fine at that visit - no med " changes were made.    Pt reports that their symptoms of ADD/ADHD are responding well to the current treatment plan. There is minimal distractibility and adequate level of focus. Able to function highly in most settings. Pt reports good ability to meet deadlines and accomplish tasks. They are sleeping well and report a normal appetite.     Between visits, the patient reports that their depression is well-controlled, with no signs of decompensation since the last session. They describe a good mood and deny experiencing anhedonia, hopelessness, or feelings of worthlessness. The patient reports maintaining a good level of motivation and denies apathy or psychomotor slowing. Additionally, they note having a healthy appetite, stable energy levels, and good-quality sleep. Overall, they remain stable and highly functional.  -        Diagnosis: ADD, mood disorder unspecified    Intervention/Counseling/Treatment Plan   Medication Management:      1. Cont Adderall XR 25mg QD. Discussed risks of abuse potential, insomnia, anxiety, elevated BP, HR, arrthymias, MI, stroke, sudden death     2. Cont Lexapro 20mg QD for mood/anxiety.  Discussed potential for GI side effects, sexual dysfunction, mood destabilization, headaches     3. Call to report any worsening of symptoms or problems with the medication. Pt instructed to go to ER with thoughts of harming self, others     4. Patient given contact # for psychotherapists at [facility] and also instructed she may check with insurance for list of providers.      5. Labs: no new orders     Return to clinic: 3 month - self      -Spent 30min face to face with the pt; >50% time spent in counseling   -Supportive therapy and psychoeducation provided  -R/B/SE's of medications discussed with the pt who expresses understanding and chooses to take medications as prescribed.   -Pt instructed to call clinic, 911 or go to nearest emergency room if sxs worsen or pt is in   crisis. The pt  expresses understanding.    Vito Brantley, NP

## 2025-07-29 ENCOUNTER — TELEPHONE (OUTPATIENT)
Dept: PSYCHIATRY | Facility: CLINIC | Age: 40
End: 2025-07-29
Payer: COMMERCIAL

## 2025-08-02 DIAGNOSIS — F98.8 ATTENTION DEFICIT DISORDER, UNSPECIFIED TYPE: ICD-10-CM

## 2025-08-04 RX ORDER — DEXTROAMPHETAMINE SACCHARATE, AMPHETAMINE ASPARTATE MONOHYDRATE, DEXTROAMPHETAMINE SULFATE AND AMPHETAMINE SULFATE 6.25; 6.25; 6.25; 6.25 MG/1; MG/1; MG/1; MG/1
25 CAPSULE, EXTENDED RELEASE ORAL EVERY MORNING
Qty: 30 CAPSULE | Refills: 0 | Status: SHIPPED | OUTPATIENT
Start: 2025-08-04

## 2025-09-02 DIAGNOSIS — F98.8 ATTENTION DEFICIT DISORDER, UNSPECIFIED TYPE: ICD-10-CM

## 2025-09-03 RX ORDER — DEXTROAMPHETAMINE SACCHARATE, AMPHETAMINE ASPARTATE MONOHYDRATE, DEXTROAMPHETAMINE SULFATE AND AMPHETAMINE SULFATE 6.25; 6.25; 6.25; 6.25 MG/1; MG/1; MG/1; MG/1
25 CAPSULE, EXTENDED RELEASE ORAL EVERY MORNING
Qty: 30 CAPSULE | Refills: 0 | Status: SHIPPED | OUTPATIENT
Start: 2025-09-03

## 2025-09-03 RX ORDER — ESCITALOPRAM OXALATE 20 MG/1
20 TABLET ORAL DAILY
Qty: 30 TABLET | Refills: 3 | Status: SHIPPED | OUTPATIENT
Start: 2025-09-03

## (undated) DEVICE — BIT DRILL AO 3.5X122MM

## (undated) DEVICE — DRAPE STERI U-SHAPED 47X51IN

## (undated) DEVICE — DRAPE PLASTIC U 60X72

## (undated) DEVICE — SEE MEDLINE ITEM 146231

## (undated) DEVICE — SEE MEDLINE ITEM 152530

## (undated) DEVICE — BIT DRILL AO 2.6X135MM SCALED

## (undated) DEVICE — SUT MONO 2-0 CT-1 UNDYED

## (undated) DEVICE — DRAPE EXTREMITY W/ABC NON-SLIP

## (undated) DEVICE — SEE MEDLINE ITEM 152622

## (undated) DEVICE — SEE MEDLINE ITEM 157166

## (undated) DEVICE — SUT ETHILON 3-0 PS2 18 BLK

## (undated) DEVICE — PAD ABD 8X10 STERILE

## (undated) DEVICE — ALCOHOL 70% ISOP RUBBING 4OZ

## (undated) DEVICE — SPLINT PLASTER FS 5IN X 30IN

## (undated) DEVICE — SOL IRR NACL .9% 3000ML

## (undated) DEVICE — BANDAGE ESMARK 6X12

## (undated) DEVICE — SEE MEDLINE ITEM 157117

## (undated) DEVICE — Device

## (undated) DEVICE — GAUZE SPONGE 4X4 12PLY

## (undated) DEVICE — SOL 9P NACL IRR PIC IL

## (undated) DEVICE — SEE MEDLINE ITEM 146298

## (undated) DEVICE — SEE MEDLINE ITEM 157131

## (undated) DEVICE — SYR 10CC LUER LOCK

## (undated) DEVICE — PAD CAST SPECIALIST STRL 4

## (undated) DEVICE — SUT MONOCRYL 3-0 SH U/D

## (undated) DEVICE — PAD CAST SPECIALIST STRL 6

## (undated) DEVICE — GAUZE SPONGE 8X4 12 PLY

## (undated) DEVICE — DRAPE C-ARM FOR MOBILE XRAY

## (undated) DEVICE — STRAP ANKLE ARTHSCP DSRCTOR

## (undated) DEVICE — NDL 22GA X1 1/2 REG BEVEL

## (undated) DEVICE — DURAPREP SURG SCRUB 26ML

## (undated) DEVICE — BLADE SURG CARBON STEEL SZ11

## (undated) DEVICE — SUT MONOCRYL 2-0 S UND

## (undated) DEVICE — GLOVE SURGEONS ULTRA TOUCH 6.5

## (undated) DEVICE — SET IRR URLGY 2LINE UNIV SPIKE

## (undated) DEVICE — UNDERGLOVE BIOGEL PI SZ 6.5 LF

## (undated) DEVICE — ELECTRODE REM PLYHSV RETURN 9

## (undated) DEVICE — BLADE SURG #15 CARBON STEEL

## (undated) DEVICE — SUT MONOCRYL 0 CT-1 UND MON

## (undated) DEVICE — SUT CTD VICRYL 0 UND BR

## (undated) DEVICE — NDL SPINAL 18GX3.5 SPINOCAN

## (undated) DEVICE — DRESSING XEROFORM STRL 2X2